# Patient Record
Sex: MALE | Race: WHITE | Employment: FULL TIME | URBAN - METROPOLITAN AREA
[De-identification: names, ages, dates, MRNs, and addresses within clinical notes are randomized per-mention and may not be internally consistent; named-entity substitution may affect disease eponyms.]

---

## 2017-04-06 DIAGNOSIS — I10 ESSENTIAL HYPERTENSION: ICD-10-CM

## 2017-04-06 RX ORDER — LISINOPRIL AND HYDROCHLOROTHIAZIDE 25; 20 MG/1; MG/1
1 TABLET ORAL DAILY
Qty: 90 TABLET | Refills: 0 | Status: SHIPPED | OUTPATIENT
Start: 2017-04-06 | End: 2017-07-07 | Stop reason: SDUPTHER

## 2017-04-21 ENCOUNTER — OFFICE VISIT (OUTPATIENT)
Dept: FAMILY MEDICINE CLINIC | Age: 57
End: 2017-04-21

## 2017-04-21 VITALS
WEIGHT: 263 LBS | HEART RATE: 72 BPM | HEIGHT: 71 IN | SYSTOLIC BLOOD PRESSURE: 118 MMHG | BODY MASS INDEX: 36.82 KG/M2 | DIASTOLIC BLOOD PRESSURE: 84 MMHG | RESPIRATION RATE: 20 BRPM | TEMPERATURE: 97.3 F

## 2017-04-21 DIAGNOSIS — N40.0 BENIGN PROSTATIC HYPERPLASIA WITHOUT LOWER URINARY TRACT SYMPTOMS, UNSPECIFIED MORPHOLOGY: ICD-10-CM

## 2017-04-21 DIAGNOSIS — Z87.898 HISTORY OF ELEVATED PSA: ICD-10-CM

## 2017-04-21 DIAGNOSIS — E11.9 CONTROLLED TYPE 2 DIABETES MELLITUS WITHOUT COMPLICATION, WITHOUT LONG-TERM CURRENT USE OF INSULIN (HCC): Primary | ICD-10-CM

## 2017-04-21 DIAGNOSIS — L30.9 DERMATITIS: ICD-10-CM

## 2017-04-21 DIAGNOSIS — E78.00 PURE HYPERCHOLESTEROLEMIA: ICD-10-CM

## 2017-04-21 DIAGNOSIS — E11.311 DIABETIC MACULAR EDEMA (HCC): ICD-10-CM

## 2017-04-21 DIAGNOSIS — I10 ESSENTIAL HYPERTENSION: ICD-10-CM

## 2017-04-21 DIAGNOSIS — Z11.4 SCREENING FOR HIV (HUMAN IMMUNODEFICIENCY VIRUS): ICD-10-CM

## 2017-04-21 PROCEDURE — 99214 OFFICE O/P EST MOD 30 MIN: CPT | Performed by: NURSE PRACTITIONER

## 2017-04-21 RX ORDER — KETOCONAZOLE 20 MG/ML
SHAMPOO TOPICAL
Qty: 1 BOTTLE | Refills: 1 | Status: SHIPPED | OUTPATIENT
Start: 2017-04-21 | End: 2018-04-21

## 2017-04-21 ASSESSMENT — ENCOUNTER SYMPTOMS
COUGH: 0
TROUBLE SWALLOWING: 0
WHEEZING: 0
CONSTIPATION: 0
DIARRHEA: 0
BLURRED VISION: 0
NAUSEA: 0
SHORTNESS OF BREATH: 0
VOMITING: 0
RHINORRHEA: 0
EYE PAIN: 0
BACK PAIN: 0
EYE DISCHARGE: 0
SORE THROAT: 0
ABDOMINAL PAIN: 0

## 2017-04-21 ASSESSMENT — PATIENT HEALTH QUESTIONNAIRE - PHQ9
2. FEELING DOWN, DEPRESSED OR HOPELESS: 0
SUM OF ALL RESPONSES TO PHQ9 QUESTIONS 1 & 2: 1
SUM OF ALL RESPONSES TO PHQ QUESTIONS 1-9: 1
1. LITTLE INTEREST OR PLEASURE IN DOING THINGS: 1

## 2017-04-26 ENCOUNTER — HOSPITAL ENCOUNTER (OUTPATIENT)
Age: 57
Setting detail: SPECIMEN
Discharge: HOME | End: 2017-04-26
Payer: COMMERCIAL

## 2017-04-26 DIAGNOSIS — Z87.898 HISTORY OF ELEVATED PSA: ICD-10-CM

## 2017-04-26 DIAGNOSIS — I10 ESSENTIAL HYPERTENSION: ICD-10-CM

## 2017-04-26 DIAGNOSIS — Z11.4 SCREENING FOR HIV (HUMAN IMMUNODEFICIENCY VIRUS): ICD-10-CM

## 2017-04-26 DIAGNOSIS — E78.00 PURE HYPERCHOLESTEROLEMIA: ICD-10-CM

## 2017-04-26 DIAGNOSIS — E11.9 CONTROLLED TYPE 2 DIABETES MELLITUS WITHOUT COMPLICATION, WITHOUT LONG-TERM CURRENT USE OF INSULIN (HCC): ICD-10-CM

## 2017-04-26 LAB
ALBUMIN SERPL-MCNC: 4.1 G/DL (ref 3.5–5.2)
ALBUMIN/GLOBULIN RATIO: 1.4 (ref 1–2.5)
ALP BLD-CCNC: 81 U/L (ref 40–129)
ALT SERPL-CCNC: 34 U/L (ref 5–41)
ANION GAP SERPL CALCULATED.3IONS-SCNC: 12 MMOL/L (ref 9–17)
AST SERPL-CCNC: 23 U/L
BILIRUB SERPL-MCNC: 0.66 MG/DL (ref 0.3–1.2)
BUN BLDV-MCNC: 13 MG/DL (ref 6–20)
BUN/CREAT BLD: ABNORMAL (ref 9–20)
CALCIUM SERPL-MCNC: 9.6 MG/DL (ref 8.6–10.4)
CHLORIDE BLD-SCNC: 103 MMOL/L (ref 98–107)
CHOLESTEROL/HDL RATIO: 2.6
CHOLESTEROL: 141 MG/DL
CO2: 26 MMOL/L (ref 20–31)
CREAT SERPL-MCNC: 0.74 MG/DL (ref 0.7–1.2)
ESTIMATED AVERAGE GLUCOSE: 140 MG/DL
GFR AFRICAN AMERICAN: >60 ML/MIN
GFR NON-AFRICAN AMERICAN: >60 ML/MIN
GFR SERPL CREATININE-BSD FRML MDRD: ABNORMAL ML/MIN/{1.73_M2}
GFR SERPL CREATININE-BSD FRML MDRD: ABNORMAL ML/MIN/{1.73_M2}
GLUCOSE BLD-MCNC: 116 MG/DL (ref 70–99)
HBA1C MFR BLD: 6.5 % (ref 4–6)
HDLC SERPL-MCNC: 55 MG/DL
HIV AG/AB: NONREACTIVE
LDL CHOLESTEROL: 76 MG/DL (ref 0–130)
POTASSIUM SERPL-SCNC: 4.8 MMOL/L (ref 3.7–5.3)
PROSTATE SPECIFIC ANTIGEN: 7.29 UG/L
SODIUM BLD-SCNC: 141 MMOL/L (ref 135–144)
TOTAL PROTEIN: 7.1 G/DL (ref 6.4–8.3)
TRIGL SERPL-MCNC: 51 MG/DL
VLDLC SERPL CALC-MCNC: NORMAL MG/DL (ref 1–30)

## 2017-05-16 DIAGNOSIS — E78.00 PURE HYPERCHOLESTEROLEMIA: ICD-10-CM

## 2017-05-17 RX ORDER — SIMVASTATIN 20 MG
20 TABLET ORAL NIGHTLY
Qty: 90 TABLET | Refills: 3 | Status: SHIPPED | OUTPATIENT
Start: 2017-05-17 | End: 2018-05-16 | Stop reason: SDUPTHER

## 2017-05-24 LAB
CHOLESTEROL/HDL RATIO: 3.3
CHOLESTEROL: 154 MG/DL
GLUCOSE BLD-MCNC: 119 MG/DL (ref 70–99)
HDLC SERPL-MCNC: 46 MG/DL
LDL CHOLESTEROL: 93 MG/DL (ref 0–130)
PATIENT FASTING?: YES
TRIGL SERPL-MCNC: 73 MG/DL
VLDLC SERPL CALC-MCNC: ABNORMAL MG/DL (ref 1–30)

## 2017-05-26 ENCOUNTER — EMPLOYEE WELLNESS (OUTPATIENT)
Dept: OTHER | Age: 57
End: 2017-05-26

## 2017-07-07 DIAGNOSIS — I10 ESSENTIAL HYPERTENSION: ICD-10-CM

## 2017-07-07 RX ORDER — LISINOPRIL AND HYDROCHLOROTHIAZIDE 25; 20 MG/1; MG/1
1 TABLET ORAL DAILY
Qty: 90 TABLET | Refills: 1 | Status: SHIPPED | OUTPATIENT
Start: 2017-07-07 | End: 2017-10-20 | Stop reason: SDUPTHER

## 2017-10-20 ENCOUNTER — HOSPITAL ENCOUNTER (OUTPATIENT)
Age: 57
Setting detail: SPECIMEN
Discharge: HOME | End: 2017-10-20
Payer: COMMERCIAL

## 2017-10-20 ENCOUNTER — OFFICE VISIT (OUTPATIENT)
Dept: FAMILY MEDICINE CLINIC | Age: 57
End: 2017-10-20
Payer: COMMERCIAL

## 2017-10-20 VITALS
SYSTOLIC BLOOD PRESSURE: 110 MMHG | DIASTOLIC BLOOD PRESSURE: 80 MMHG | HEIGHT: 71 IN | WEIGHT: 261.6 LBS | BODY MASS INDEX: 36.62 KG/M2 | RESPIRATION RATE: 16 BRPM | HEART RATE: 82 BPM

## 2017-10-20 DIAGNOSIS — E78.00 PURE HYPERCHOLESTEROLEMIA: ICD-10-CM

## 2017-10-20 DIAGNOSIS — R97.20 ELEVATED PSA: ICD-10-CM

## 2017-10-20 DIAGNOSIS — E11.9 CONTROLLED TYPE 2 DIABETES MELLITUS WITHOUT COMPLICATION, WITHOUT LONG-TERM CURRENT USE OF INSULIN (HCC): ICD-10-CM

## 2017-10-20 DIAGNOSIS — N40.0 BENIGN PROSTATIC HYPERPLASIA WITHOUT LOWER URINARY TRACT SYMPTOMS: ICD-10-CM

## 2017-10-20 DIAGNOSIS — I10 ESSENTIAL HYPERTENSION: ICD-10-CM

## 2017-10-20 DIAGNOSIS — E11.311 DIABETIC MACULAR EDEMA (HCC): ICD-10-CM

## 2017-10-20 DIAGNOSIS — E11.9 CONTROLLED TYPE 2 DIABETES MELLITUS WITHOUT COMPLICATION, WITHOUT LONG-TERM CURRENT USE OF INSULIN (HCC): Primary | ICD-10-CM

## 2017-10-20 LAB
ALBUMIN SERPL-MCNC: 4.2 G/DL (ref 3.5–5.2)
ALBUMIN/GLOBULIN RATIO: 1.5 (ref 1–2.5)
ALP BLD-CCNC: 89 U/L (ref 40–129)
ALT SERPL-CCNC: 48 U/L (ref 5–41)
ANION GAP SERPL CALCULATED.3IONS-SCNC: 15 MMOL/L (ref 9–17)
AST SERPL-CCNC: 28 U/L
BILIRUB SERPL-MCNC: 0.75 MG/DL (ref 0.3–1.2)
BUN BLDV-MCNC: 17 MG/DL (ref 6–20)
BUN/CREAT BLD: ABNORMAL (ref 9–20)
CALCIUM SERPL-MCNC: 9.9 MG/DL (ref 8.6–10.4)
CHLORIDE BLD-SCNC: 99 MMOL/L (ref 98–107)
CHOLESTEROL/HDL RATIO: 3
CHOLESTEROL: 142 MG/DL
CO2: 27 MMOL/L (ref 20–31)
CREAT SERPL-MCNC: 0.72 MG/DL (ref 0.7–1.2)
CREATININE URINE: 146.3 MG/DL (ref 39–259)
GFR AFRICAN AMERICAN: >60 ML/MIN
GFR NON-AFRICAN AMERICAN: >60 ML/MIN
GFR SERPL CREATININE-BSD FRML MDRD: ABNORMAL ML/MIN/{1.73_M2}
GFR SERPL CREATININE-BSD FRML MDRD: ABNORMAL ML/MIN/{1.73_M2}
GLUCOSE BLD-MCNC: 111 MG/DL (ref 70–99)
HCT VFR BLD CALC: 43.8 % (ref 41–53)
HDLC SERPL-MCNC: 47 MG/DL
HEMOGLOBIN: 14.7 G/DL (ref 13.5–17.5)
LDL CHOLESTEROL: 75 MG/DL (ref 0–130)
MCH RBC QN AUTO: 29.3 PG (ref 26–34)
MCHC RBC AUTO-ENTMCNC: 33.6 G/DL (ref 31–37)
MCV RBC AUTO: 87.2 FL (ref 80–100)
MICROALBUMIN/CREAT 24H UR: <12 MG/L
MICROALBUMIN/CREAT UR-RTO: 8 MCG/MG CREAT
PDW BLD-RTO: 14.3 % (ref 12.5–15.4)
PLATELET # BLD: 169 K/UL (ref 140–450)
PMV BLD AUTO: 8.2 FL (ref 6–12)
POTASSIUM SERPL-SCNC: 4.3 MMOL/L (ref 3.7–5.3)
RBC # BLD: 5.02 M/UL (ref 4.5–5.9)
SODIUM BLD-SCNC: 141 MMOL/L (ref 135–144)
TOTAL PROTEIN: 7 G/DL (ref 6.4–8.3)
TRIGL SERPL-MCNC: 98 MG/DL
VLDLC SERPL CALC-MCNC: NORMAL MG/DL (ref 1–30)
WBC # BLD: 4.5 K/UL (ref 3.5–11)

## 2017-10-20 PROCEDURE — 99214 OFFICE O/P EST MOD 30 MIN: CPT | Performed by: NURSE PRACTITIONER

## 2017-10-20 RX ORDER — LISINOPRIL AND HYDROCHLOROTHIAZIDE 25; 20 MG/1; MG/1
1 TABLET ORAL DAILY
Qty: 90 TABLET | Refills: 1 | Status: SHIPPED | OUTPATIENT
Start: 2017-10-20 | End: 2018-05-16 | Stop reason: SDUPTHER

## 2017-10-20 ASSESSMENT — ENCOUNTER SYMPTOMS
VOMITING: 0
RHINORRHEA: 1
TROUBLE SWALLOWING: 0
DIARRHEA: 0
NAUSEA: 0
CONSTIPATION: 0
EYE DISCHARGE: 0
BACK PAIN: 1
BLURRED VISION: 0
EYE PAIN: 0
COUGH: 0
SHORTNESS OF BREATH: 0
SORE THROAT: 0
ABDOMINAL PAIN: 0
WHEEZING: 0

## 2017-10-20 NOTE — PROGRESS NOTES
and flavored carbonated perez. Was drinking a lot of Pepsi. Got flu shot at work on Tuesday. Hypertension   This is a chronic problem. The current episode started more than 1 year ago. The problem is controlled. Pertinent negatives include no blurred vision, chest pain, headaches, shortness of breath or sweats. Risk factors for coronary artery disease include diabetes mellitus, male gender, obesity, dyslipidemia and sedentary lifestyle. Compliance problems include diet and exercise. Diabetes   He presents for his follow-up diabetic visit. He has type 2 diabetes mellitus. The initial diagnosis of diabetes was made 8 years ago. His disease course has been stable. There are no hypoglycemic associated symptoms. Pertinent negatives for hypoglycemia include no confusion, dizziness, headaches, nervousness/anxiousness, sleepiness or sweats. There are no diabetic associated symptoms. Pertinent negatives for diabetes include no blurred vision and no chest pain. There are no hypoglycemic complications. There are no diabetic complications. Risk factors for coronary artery disease include diabetes mellitus, hypertension, male sex and obesity. He is compliant with treatment most of the time. When asked about meal planning, he reported none. He has not had a previous visit with a dietitian. Exercise: twice a week. There is no change in his home blood glucose trend. His breakfast blood glucose is taken between 5-6 am. His breakfast blood glucose range is generally 130-140 mg/dl. He does not see a podiatrist.Eye exam is current. Hyperlipidemia   This is a chronic problem. The current episode started more than 1 year ago. Exacerbating diseases include diabetes and obesity. Pertinent negatives include no chest pain or shortness of breath. Compliance problems include adherence to diet and adherence to exercise.   Risk factors for coronary artery disease include diabetes mellitus, hypertension, male sex, obesity and a Alana Rosales MD   6. Diabetic macular edema (HCC)             Plan:      Complete routine labs when fasting  Continue medications as prescribed  Recommend healthy diet and regular exercise  Encouraged to monitor blood sugars regularly  Proceed with referral to Urology. He never followed up with Dr. Ivan Jaime. Prefers to see specialist at [facility]. Diabetic foot exam today  Will obtain records from eye exam  Call office with concerns2        1. Valerio received counseling on the following healthy behaviors: nutrition, exercise and medication adherence  2. Reviewed prior labs and health maintenance  3. Continue current medications, diet and exercise. 4.  Discussed use, benefit, and side effects of prescribed medications. Barriers to medication compliance addressed. All patient questions answered. Pt voiced understanding. 5.  Patient given educational materials - see patient instructions  6. Was a self-tracking handout given in paper form or via [employer]? No  If yes, see orders or list here.     PHQ Scores 4/21/2017 4/8/2016   PHQ2 Score 1 0   PHQ9 Score 1 0     Interpretation of Total Score Depression Severity: 1-4 = Minimal depression, 5-9 = Mild depression, 10-14 = Moderate depression, 15-19 = Moderately severe depression, 20-27 = Severe depression  mild depressed mood, will follow    Completed Refills   Requested Prescriptions     Signed Prescriptions Disp Refills    lisinopril-hydrochlorothiazide (PRINZIDE;ZESTORETIC) 20-25 MG per tablet 90 tablet 1     Sig: Take 1 tablet by mouth daily

## 2017-10-20 NOTE — PROGRESS NOTES
Subjective:      Patient ID: Anitra Gates is a 59 y.o. male. Visit Information    Have you changed or started any medications since your last visit including any over-the-counter medicines, vitamins, or herbal medicines? no   Are you having any side effects from any of your medications? -  no  Have you stopped taking any of your medications? Is so, why? -  no    Have you seen any other physician or provider since your last visit? Yes - Records Requested  Have you had any other diagnostic tests since your last visit? No  Have you been seen in the emergency room and/or had an admission to a hospital since we last saw you? No  Have you had your routine dental cleaning in the past 6 months? no    Have you activated your [employer] account? If not, what are your barriers? Yes     Patient Care Team:  Ryan Rosales CNP as PCP - General (Family Medicine)  Ryan Rosales CNP as PCP - S Attributed Provider    Medical History Review  Past Medical, Family, and Social History reviewed and does contribute to the patient presenting condition    Health Maintenance   Topic Date Due    Diabetic retinal exam  08/12/2017    Diabetic foot exam  10/07/2017    Diabetic microalbuminuria test  10/12/2017    Diabetic hemoglobin A1C test  04/26/2018    Lipid screen  04/26/2018    Colon cancer screen colonoscopy  01/10/2023    DTaP/Tdap/Td vaccine (2 - Td) 07/10/2023    Flu vaccine  Completed    Pneumococcal med risk  Completed    Hepatitis C screen  Completed    HIV screen  Completed       Patient presents in office today for routine follow up on chronic conditions including hypertension, hyperlipidemia, diabetes and elevated PSA. Checking blood sugars couple times per week in AM.       Hypertension   This is a chronic problem. The current episode started more than 1 year ago. The problem is unchanged. The problem is controlled. There are no associated agents to hypertension.  Risk factors for coronary artery disease include

## 2017-10-21 LAB
ESTIMATED AVERAGE GLUCOSE: 151 MG/DL
HBA1C MFR BLD: 6.9 % (ref 4–6)

## 2017-10-23 DIAGNOSIS — E11.9 CONTROLLED TYPE 2 DIABETES MELLITUS WITHOUT COMPLICATION, WITHOUT LONG-TERM CURRENT USE OF INSULIN (HCC): Primary | ICD-10-CM

## 2017-11-07 DIAGNOSIS — E11.9 CONTROLLED TYPE 2 DIABETES MELLITUS WITHOUT COMPLICATION, WITHOUT LONG-TERM CURRENT USE OF INSULIN (HCC): Primary | ICD-10-CM

## 2017-12-13 ENCOUNTER — CLINICAL DOCUMENTATION (OUTPATIENT)
Dept: PHARMACY | Facility: CLINIC | Age: 57
End: 2017-12-13

## 2018-02-07 DIAGNOSIS — E11.9 CONTROLLED TYPE 2 DIABETES MELLITUS WITHOUT COMPLICATION, WITHOUT LONG-TERM CURRENT USE OF INSULIN (HCC): ICD-10-CM

## 2018-02-08 NOTE — TELEPHONE ENCOUNTER
Last refill was 11/7/2017 #30-2  Last visit was 10/20/2017 RTO 6 months  Health Maintenance   Topic Date Due    Diabetic retinal exam  08/12/2017    Diabetic foot exam  10/20/2018    A1C test (Diabetic or Prediabetic)  10/20/2018    Diabetic microalbuminuria test  10/20/2018    Lipid screen  10/20/2018    Potassium monitoring  10/20/2018    Creatinine monitoring  10/20/2018    Colon cancer screen colonoscopy  01/10/2023    DTaP/Tdap/Td vaccine (2 - Td) 07/10/2023    Flu vaccine  Completed    Pneumococcal med risk  Completed    Hepatitis C screen  Completed    HIV screen  Completed             (applicable per patient's age: Cancer Screenings, Depression Screening, Fall Risk Screening, Immunizations)    Hemoglobin A1C (%)   Date Value   10/20/2017 6.9 (H)   04/26/2017 6.5 (H)   10/12/2016 6.6 (H)     Microalb/Crt.  Ratio (mcg/mg creat)   Date Value   10/20/2017 8     LDL Cholesterol (mg/dL)   Date Value   10/20/2017 75     AST (U/L)   Date Value   10/20/2017 28     ALT (U/L)   Date Value   10/20/2017 48 (H)     BUN (mg/dL)   Date Value   10/20/2017 17      (goal A1C is < 7)   (goal LDL is <100) need 30-50% reduction from baseline     BP Readings from Last 3 Encounters:   10/20/17 110/80   04/21/17 118/84   10/07/16 (!) 152/94    (goal /80)      All Future Testing planned in [facility]:  Lab Frequency Next Occurrence   Hemoglobin A1C Once 01/21/2018   Comprehensive Metabolic Panel Once 42/20/9668       Next Visit Date:  Future Appointments  Date Time Provider Kyaw Cramer   4/20/2018 8:00 AM DELORIS Amaya [facility]            Patient Active Problem List:     Hypertension     Hyperlipidemia     Elevated liver enzymes     BPH (benign prostatic hyperplasia)     Diabetic macular edema (HCC)     Diabetes mellitus type II, controlled ([address]     Elevated PSA

## 2018-03-20 VITALS — WEIGHT: 258 LBS | BODY MASS INDEX: 35.98 KG/M2

## 2018-05-07 ENCOUNTER — TELEPHONE (OUTPATIENT)
Dept: PHARMACY | Facility: CLINIC | Age: 58
End: 2018-05-07

## 2018-05-10 ENCOUNTER — TELEPHONE (OUTPATIENT)
Dept: FAMILY MEDICINE CLINIC | Age: 58
End: 2018-05-10

## 2018-05-16 ENCOUNTER — OFFICE VISIT (OUTPATIENT)
Dept: FAMILY MEDICINE CLINIC | Age: 58
End: 2018-05-16
Payer: COMMERCIAL

## 2018-05-16 ENCOUNTER — HOSPITAL ENCOUNTER (OUTPATIENT)
Age: 58
Setting detail: SPECIMEN
Discharge: HOME | End: 2018-05-16
Payer: COMMERCIAL

## 2018-05-16 VITALS
RESPIRATION RATE: 18 BRPM | HEART RATE: 76 BPM | BODY MASS INDEX: 35.57 KG/M2 | TEMPERATURE: 96.1 F | DIASTOLIC BLOOD PRESSURE: 84 MMHG | SYSTOLIC BLOOD PRESSURE: 127 MMHG | WEIGHT: 255 LBS

## 2018-05-16 DIAGNOSIS — R97.20 ELEVATED PSA: ICD-10-CM

## 2018-05-16 DIAGNOSIS — I10 ESSENTIAL HYPERTENSION: Primary | ICD-10-CM

## 2018-05-16 DIAGNOSIS — E11.9 CONTROLLED TYPE 2 DIABETES MELLITUS WITHOUT COMPLICATION, WITHOUT LONG-TERM CURRENT USE OF INSULIN (HCC): ICD-10-CM

## 2018-05-16 DIAGNOSIS — E11.311 DIABETIC MACULAR EDEMA (HCC): ICD-10-CM

## 2018-05-16 DIAGNOSIS — H61.23 BILATERAL IMPACTED CERUMEN: ICD-10-CM

## 2018-05-16 DIAGNOSIS — E78.00 PURE HYPERCHOLESTEROLEMIA: ICD-10-CM

## 2018-05-16 LAB
ALBUMIN SERPL-MCNC: 4.1 G/DL (ref 3.5–5.2)
ALBUMIN/GLOBULIN RATIO: 1.3 (ref 1–2.5)
ALP BLD-CCNC: 90 U/L (ref 40–129)
ALT SERPL-CCNC: 39 U/L (ref 5–41)
ANION GAP SERPL CALCULATED.3IONS-SCNC: 14 MMOL/L (ref 9–17)
AST SERPL-CCNC: 27 U/L
BILIRUB SERPL-MCNC: 0.46 MG/DL (ref 0.3–1.2)
BUN BLDV-MCNC: 12 MG/DL (ref 6–20)
BUN/CREAT BLD: ABNORMAL (ref 9–20)
CALCIUM SERPL-MCNC: 9.3 MG/DL (ref 8.6–10.4)
CHLORIDE BLD-SCNC: 100 MMOL/L (ref 98–107)
CHOLESTEROL, FASTING: 139 MG/DL
CHOLESTEROL/HDL RATIO: 3
CO2: 24 MMOL/L (ref 20–31)
CREAT SERPL-MCNC: 0.72 MG/DL (ref 0.7–1.2)
ESTIMATED AVERAGE GLUCOSE: 131 MG/DL
GFR AFRICAN AMERICAN: >60 ML/MIN
GFR NON-AFRICAN AMERICAN: >60 ML/MIN
GFR SERPL CREATININE-BSD FRML MDRD: ABNORMAL ML/MIN/{1.73_M2}
GFR SERPL CREATININE-BSD FRML MDRD: ABNORMAL ML/MIN/{1.73_M2}
GLUCOSE BLD-MCNC: 116 MG/DL (ref 70–99)
HBA1C MFR BLD: 6.2 % (ref 4–6)
HDLC SERPL-MCNC: 47 MG/DL
LDL CHOLESTEROL: 73 MG/DL (ref 0–130)
POTASSIUM SERPL-SCNC: 4.4 MMOL/L (ref 3.7–5.3)
PROSTATE SPECIFIC ANTIGEN: 8.56 UG/L
SODIUM BLD-SCNC: 138 MMOL/L (ref 135–144)
TOTAL PROTEIN: 7.2 G/DL (ref 6.4–8.3)
TRIGLYCERIDE, FASTING: 97 MG/DL
VLDLC SERPL CALC-MCNC: NORMAL MG/DL (ref 1–30)

## 2018-05-16 PROCEDURE — 99214 OFFICE O/P EST MOD 30 MIN: CPT | Performed by: NURSE PRACTITIONER

## 2018-05-16 RX ORDER — LISINOPRIL AND HYDROCHLOROTHIAZIDE 25; 20 MG/1; MG/1
1 TABLET ORAL DAILY
Qty: 90 TABLET | Refills: 1 | Status: SHIPPED | OUTPATIENT
Start: 2018-05-16 | End: 2018-07-09 | Stop reason: SDUPTHER

## 2018-05-16 RX ORDER — SIMVASTATIN 20 MG
20 TABLET ORAL NIGHTLY
Qty: 90 TABLET | Refills: 3 | Status: SHIPPED | OUTPATIENT
Start: 2018-05-16 | End: 2019-05-22 | Stop reason: SDUPTHER

## 2018-05-16 ASSESSMENT — ENCOUNTER SYMPTOMS
DIARRHEA: 0
ABDOMINAL PAIN: 0
RHINORRHEA: 0
TROUBLE SWALLOWING: 0
SHORTNESS OF BREATH: 0
CONSTIPATION: 0
WHEEZING: 0
VOMITING: 0
NAUSEA: 0
EYE PAIN: 0
SORE THROAT: 0
EYE DISCHARGE: 0
BACK PAIN: 1
COUGH: 0

## 2018-05-16 ASSESSMENT — PATIENT HEALTH QUESTIONNAIRE - PHQ9
1. LITTLE INTEREST OR PLEASURE IN DOING THINGS: 0
SUM OF ALL RESPONSES TO PHQ9 QUESTIONS 1 & 2: 0
2. FEELING DOWN, DEPRESSED OR HOPELESS: 0
SUM OF ALL RESPONSES TO PHQ QUESTIONS 1-9: 0

## 2018-05-17 ENCOUNTER — SCHEDULED TELEPHONE ENCOUNTER (OUTPATIENT)
Dept: PHARMACY | Facility: CLINIC | Age: 58
End: 2018-05-17

## 2018-05-17 DIAGNOSIS — E11.9 CONTROLLED TYPE 2 DIABETES MELLITUS WITHOUT COMPLICATION, WITHOUT LONG-TERM CURRENT USE OF INSULIN (HCC): Primary | ICD-10-CM

## 2018-05-18 RX ORDER — LANCETS 33 GAUGE
EACH MISCELLANEOUS
Qty: 100 EACH | Refills: 3 | Status: SHIPPED | OUTPATIENT
Start: 2018-05-18 | End: 2019-05-24 | Stop reason: SDUPTHER

## 2018-05-18 RX ORDER — BLOOD-GLUCOSE METER
EACH MISCELLANEOUS
Qty: 1 DEVICE | Refills: 0 | Status: SHIPPED | OUTPATIENT
Start: 2018-05-18 | End: 2019-06-04 | Stop reason: SDUPTHER

## 2018-05-18 RX ORDER — BLOOD-GLUCOSE METER
EACH MISCELLANEOUS
Qty: 1 DEVICE | Refills: 0 | Status: SHIPPED | OUTPATIENT
Start: 2018-05-18 | End: 2018-06-19 | Stop reason: SDUPTHER

## 2018-05-18 RX ORDER — ASPIRIN 81 MG/1
TABLET ORAL
Qty: 100 TABLET | Refills: 3 | Status: SHIPPED | OUTPATIENT
Start: 2018-05-18 | End: 2018-06-19 | Stop reason: SDUPTHER

## 2018-06-19 ENCOUNTER — OFFICE VISIT (OUTPATIENT)
Dept: UROLOGY | Age: 58
End: 2018-06-19
Payer: COMMERCIAL

## 2018-06-19 VITALS
WEIGHT: 258 LBS | TEMPERATURE: 97.7 F | HEART RATE: 81 BPM | DIASTOLIC BLOOD PRESSURE: 80 MMHG | BODY MASS INDEX: 36.12 KG/M2 | SYSTOLIC BLOOD PRESSURE: 130 MMHG | HEIGHT: 71 IN

## 2018-06-19 DIAGNOSIS — R97.20 ELEVATED PSA: Primary | ICD-10-CM

## 2018-06-19 DIAGNOSIS — N40.0 BPH WITHOUT OBSTRUCTION/LOWER URINARY TRACT SYMPTOMS: ICD-10-CM

## 2018-06-19 PROCEDURE — 99204 OFFICE O/P NEW MOD 45 MIN: CPT | Performed by: UROLOGY

## 2018-06-19 ASSESSMENT — ENCOUNTER SYMPTOMS
BACK PAIN: 0
ABDOMINAL PAIN: 0
SHORTNESS OF BREATH: 0
EYE PAIN: 0
NAUSEA: 0
VOMITING: 0
COUGH: 0
WHEEZING: 0
EYE REDNESS: 0
COLOR CHANGE: 0

## 2018-06-20 ENCOUNTER — OFFICE VISIT (OUTPATIENT)
Dept: FAMILY MEDICINE CLINIC | Age: 58
End: 2018-06-20
Payer: COMMERCIAL

## 2018-06-20 VITALS
HEART RATE: 82 BPM | TEMPERATURE: 97.6 F | HEIGHT: 71 IN | OXYGEN SATURATION: 98 % | WEIGHT: 258 LBS | SYSTOLIC BLOOD PRESSURE: 130 MMHG | DIASTOLIC BLOOD PRESSURE: 76 MMHG | BODY MASS INDEX: 36.12 KG/M2

## 2018-06-20 DIAGNOSIS — Z02.89 ENCOUNTER FOR PHYSICAL EXAMINATION RELATED TO EMPLOYMENT: Primary | ICD-10-CM

## 2018-06-20 PROCEDURE — 99213 OFFICE O/P EST LOW 20 MIN: CPT | Performed by: NURSE PRACTITIONER

## 2018-06-20 ASSESSMENT — ENCOUNTER SYMPTOMS
ALLERGIC/IMMUNOLOGIC NEGATIVE: 1
CHANGE IN BOWEL HABIT: 0
SORE THROAT: 0
CHEST TIGHTNESS: 0
SHORTNESS OF BREATH: 0
NAUSEA: 0
VISUAL CHANGE: 0
ABDOMINAL PAIN: 0
EYES NEGATIVE: 1
DIARRHEA: 0
EYE ITCHING: 0
EYE DISCHARGE: 0
COUGH: 0
VOMITING: 0
SWOLLEN GLANDS: 0

## 2018-07-02 ENCOUNTER — HOSPITAL ENCOUNTER (OUTPATIENT)
Dept: MRI IMAGING | Age: 58
Discharge: HOME | End: 2018-07-04
Payer: COMMERCIAL

## 2018-07-02 DIAGNOSIS — R97.20 ELEVATED PSA: ICD-10-CM

## 2018-07-02 LAB
BUN BLDV-MCNC: 17 MG/DL (ref 6–20)
CREAT SERPL-MCNC: 0.88 MG/DL (ref 0.7–1.2)
GFR AFRICAN AMERICAN: >60 ML/MIN
GFR NON-AFRICAN AMERICAN: >60 ML/MIN
GFR SERPL CREATININE-BSD FRML MDRD: NORMAL ML/MIN/{1.73_M2}
GFR SERPL CREATININE-BSD FRML MDRD: NORMAL ML/MIN/{1.73_M2}

## 2018-07-02 PROCEDURE — 82565 ASSAY OF CREATININE: CPT

## 2018-07-02 PROCEDURE — A9579 GAD-BASE MR CONTRAST NOS,1ML: HCPCS | Performed by: UROLOGY

## 2018-07-02 PROCEDURE — 2580000003 HC RX 258: Performed by: UROLOGY

## 2018-07-02 PROCEDURE — 84520 ASSAY OF UREA NITROGEN: CPT

## 2018-07-02 PROCEDURE — 72197 MRI PELVIS W/O & W/DYE: CPT

## 2018-07-02 PROCEDURE — 6360000004 HC RX CONTRAST MEDICATION: Performed by: UROLOGY

## 2018-07-02 PROCEDURE — 36415 COLL VENOUS BLD VENIPUNCTURE: CPT

## 2018-07-02 RX ORDER — 0.9 % SODIUM CHLORIDE 0.9 %
40 INTRAVENOUS SOLUTION INTRAVENOUS ONCE
Status: COMPLETED | OUTPATIENT
Start: 2018-07-02 | End: 2018-07-02

## 2018-07-02 RX ADMIN — GADOTERIDOL 20 ML: 279.3 INJECTION, SOLUTION INTRAVENOUS at 19:42

## 2018-07-02 RX ADMIN — SODIUM CHLORIDE 40 ML: 9 INJECTION, SOLUTION INTRAVENOUS at 19:42

## 2018-07-09 DIAGNOSIS — I10 ESSENTIAL HYPERTENSION: ICD-10-CM

## 2018-07-13 RX ORDER — LISINOPRIL AND HYDROCHLOROTHIAZIDE 25; 20 MG/1; MG/1
1 TABLET ORAL DAILY
Qty: 90 TABLET | Refills: 1 | Status: SHIPPED | OUTPATIENT
Start: 2018-07-13 | End: 2019-01-11 | Stop reason: SDUPTHER

## 2018-08-10 ENCOUNTER — OFFICE VISIT (OUTPATIENT)
Dept: UROLOGY | Age: 58
End: 2018-08-10
Payer: COMMERCIAL

## 2018-08-10 VITALS — SYSTOLIC BLOOD PRESSURE: 117 MMHG | TEMPERATURE: 97.9 F | HEART RATE: 85 BPM | DIASTOLIC BLOOD PRESSURE: 78 MMHG

## 2018-08-10 DIAGNOSIS — N40.0 BPH WITHOUT OBSTRUCTION/LOWER URINARY TRACT SYMPTOMS: ICD-10-CM

## 2018-08-10 DIAGNOSIS — R97.20 ELEVATED PSA: Primary | ICD-10-CM

## 2018-08-10 PROCEDURE — 99213 OFFICE O/P EST LOW 20 MIN: CPT | Performed by: UROLOGY

## 2018-08-10 ASSESSMENT — ENCOUNTER SYMPTOMS
NAUSEA: 0
EYE PAIN: 0
COLOR CHANGE: 0
ABDOMINAL PAIN: 0
EYE REDNESS: 0
COUGH: 0
WHEEZING: 0
SHORTNESS OF BREATH: 0
BACK PAIN: 0
VOMITING: 0

## 2018-08-10 NOTE — PROGRESS NOTES
and time. Psych: Mood normal, affect normal  Skin: No rash noted  HEENT: Head: Normocephalic and atraumatic  Conjunctivae and EOM are normal. Pupils are equal, round  Nose: Normal  Right External Ear: Normal; Left External Ear: Normal  Mouth: Mucosa Moist  Neck: Supple  Lungs: Respiratory effort is normal  Cardiovascular: Warm & Pink  Abdomen: Soft, non-tender, non-distended with no CVA,  No flank tenderness,  Or hepatosplenomegaly   Lymphatics: No palpable lymphadenopathy. Bladder non-tender and not distended. Musculoskeletal: Normal gait and station  Testiscles: Normal, bilaterally  Prostate:    Assessment and Plan      1. Elevated PSA    2. BPH without obstruction/lower urinary tract symptoms           Plan:         PSA has been rising, but he has had 2 negative biopsies and now a negative MRI. Will repeat a PSA in 3 months. If worsens, consider Select MDx urine test to decide on saturation biopsy vs continued observation. Return in about 3 months (around 11/10/2018) for Labs. Prescriptions Ordered:  No orders of the defined types were placed in this encounter.      Orders Placed:  Orders Placed This Encounter   Procedures    PSA, Diagnostic     Standing Status:   Future     Standing Expiration Date:   8/10/2019          Ruben Manning MD

## 2018-08-17 DIAGNOSIS — E11.9 CONTROLLED TYPE 2 DIABETES MELLITUS WITHOUT COMPLICATION, WITHOUT LONG-TERM CURRENT USE OF INSULIN (HCC): ICD-10-CM

## 2018-08-17 NOTE — TELEPHONE ENCOUNTER
LRF 5/18/2018 #100 RF3  LOV 5/16/2018  RTO 6 Months    Health Maintenance   Topic Date Due    Shingles Vaccine (1 of 2 - 2 Dose Series) 05/04/2019 (Originally 7/24/2008)    Flu vaccine (1) 09/01/2018    Diabetic retinal exam  10/18/2018    Diabetic foot exam  10/20/2018    Diabetic microalbuminuria test  10/20/2018    A1C test (Diabetic or Prediabetic)  05/16/2019    Lipid screen  05/16/2019    Potassium monitoring  05/16/2019    Creatinine monitoring  07/02/2019    Colon cancer screen colonoscopy  01/10/2023    DTaP/Tdap/Td vaccine (2 - Td) 07/10/2023    Pneumococcal med risk  Completed    Hepatitis C screen  Completed    HIV screen  Completed             (applicable per patient's age: Cancer Screenings, Depression Screening, Fall Risk Screening, Immunizations)    Hemoglobin A1C (%)   Date Value   05/16/2018 6.2 (H)   10/20/2017 6.9 (H)   04/26/2017 6.5 (H)     Microalb/Crt.  Ratio (mcg/mg creat)   Date Value   10/20/2017 8     LDL Cholesterol (mg/dL)   Date Value   05/16/2018 73     AST (U/L)   Date Value   05/16/2018 27     ALT (U/L)   Date Value   05/16/2018 39     BUN (mg/dL)   Date Value   07/02/2018 17      (goal A1C is < 7)   (goal LDL is <100) need 30-50% reduction from baseline     BP Readings from Last 3 Encounters:   08/10/18 117/78   06/20/18 130/76   06/19/18 130/80    (goal /80)      All Future Testing planned in [facility]:  Lab Frequency Next Occurrence   PSA, Diagnostic Once 11/08/2018       Next Visit Date:  Future Appointments  Date Time Provider Kyaw Cramer   11/14/2018 8:00 AM [facility]   11/20/2018 10:30 AM JAMES Silva  [facility]            Patient Active Problem List:     Hypertension     Hyperlipidemia     Elevated liver enzymes     BPH (benign prostatic hyperplasia)     Diabetic macular edema ([address]     Diabetes mellitus type II, controlled ([address]     Elevated PSA

## 2018-10-10 ENCOUNTER — PATIENT MESSAGE (OUTPATIENT)
Dept: PHARMACY | Facility: CLINIC | Age: 58
End: 2018-10-10

## 2018-11-05 ENCOUNTER — TELEPHONE (OUTPATIENT)
Dept: PHARMACY | Facility: CLINIC | Age: 58
End: 2018-11-05

## 2018-11-12 NOTE — TELEPHONE ENCOUNTER
LOV 5-16-18  LRF 5-10-18    Health Maintenance   Topic Date Due    Flu vaccine (1) 09/01/2018    Diabetic foot exam  10/20/2018    Diabetic microalbuminuria test  10/20/2018    Diabetic retinal exam  10/18/2018    Shingles Vaccine (1 of 2 - 2 Dose Series) 05/04/2019 (Originally 7/24/2008)    A1C test (Diabetic or Prediabetic)  05/16/2019    Lipid screen  05/16/2019    Potassium monitoring  05/16/2019    Creatinine monitoring  07/02/2019    Colon cancer screen colonoscopy  01/10/2023    DTaP/Tdap/Td vaccine (2 - Td) 07/10/2023    Pneumococcal med risk  Completed    Hepatitis C screen  Completed    HIV screen  Completed             (applicable per patient's age: Cancer Screenings, Depression Screening, Fall Risk Screening, Immunizations)    Hemoglobin A1C (%)   Date Value   05/16/2018 6.2 (H)   10/20/2017 6.9 (H)   04/26/2017 6.5 (H)     Microalb/Crt.  Ratio (mcg/mg creat)   Date Value   10/20/2017 8     LDL Cholesterol (mg/dL)   Date Value   05/16/2018 73     AST (U/L)   Date Value   05/16/2018 27     ALT (U/L)   Date Value   05/16/2018 39     BUN (mg/dL)   Date Value   07/02/2018 17      (goal A1C is < 7)   (goal LDL is <100) need 30-50% reduction from baseline     BP Readings from Last 3 Encounters:   08/10/18 117/78   06/20/18 130/76   06/19/18 130/80    (goal /80)      All Future Testing planned in [facility]:  Lab Frequency Next Occurrence   PSA, Diagnostic Once 11/08/2018       Next Visit Date:  Future Appointments  Date Time Provider Kyaw Cramer   11/14/2018 8:00 AM [facility]e [facility]ro [facility] - [facility]   11/20/2018 10:30 AM Rodrigo Cruz APRN -  [facility]            Patient Active Problem List:     Hypertension     Hyperlipidemia     Elevated liver enzymes     BPH (benign prostatic hyperplasia)     Diabetic macular edema ([address]     Diabetes mellitus type II, controlled ([address]     Elevated PSA

## 2018-11-16 ENCOUNTER — HOSPITAL ENCOUNTER (OUTPATIENT)
Age: 58
Discharge: HOME | End: 2018-11-16
Payer: COMMERCIAL

## 2018-11-16 DIAGNOSIS — R97.20 ELEVATED PSA: ICD-10-CM

## 2018-11-16 LAB — PROSTATE SPECIFIC ANTIGEN: 6.79 UG/L

## 2018-11-16 PROCEDURE — 36415 COLL VENOUS BLD VENIPUNCTURE: CPT

## 2018-11-16 PROCEDURE — 84153 ASSAY OF PSA TOTAL: CPT

## 2018-11-20 ENCOUNTER — OFFICE VISIT (OUTPATIENT)
Dept: UROLOGY | Age: 58
End: 2018-11-20
Payer: COMMERCIAL

## 2018-11-20 ENCOUNTER — TELEPHONE (OUTPATIENT)
Dept: UROLOGY | Age: 58
End: 2018-11-20

## 2018-11-20 VITALS
DIASTOLIC BLOOD PRESSURE: 111 MMHG | HEIGHT: 71 IN | WEIGHT: 260 LBS | HEART RATE: 78 BPM | TEMPERATURE: 97.8 F | SYSTOLIC BLOOD PRESSURE: 158 MMHG | BODY MASS INDEX: 36.4 KG/M2

## 2018-11-20 DIAGNOSIS — R35.1 BENIGN PROSTATIC HYPERPLASIA WITH NOCTURIA: ICD-10-CM

## 2018-11-20 DIAGNOSIS — R39.12 WEAK URINARY STREAM: ICD-10-CM

## 2018-11-20 DIAGNOSIS — R97.20 ELEVATED PSA: Primary | ICD-10-CM

## 2018-11-20 DIAGNOSIS — N40.1 BENIGN PROSTATIC HYPERPLASIA WITH NOCTURIA: ICD-10-CM

## 2018-11-20 PROCEDURE — 99213 OFFICE O/P EST LOW 20 MIN: CPT | Performed by: NURSE PRACTITIONER

## 2018-11-20 ASSESSMENT — ENCOUNTER SYMPTOMS
VOMITING: 0
ABDOMINAL PAIN: 0
EYE REDNESS: 0
EYE PAIN: 0
WHEEZING: 0
NAUSEA: 0
BACK PAIN: 0
COLOR CHANGE: 0
SHORTNESS OF BREATH: 0
COUGH: 0

## 2018-11-20 NOTE — PROGRESS NOTES
months. Past BX and MRI - negative. Urinating well- continue blood sugar control. Prostate exam per Dr 2106 [facility] Shana- 48 GMS, no nodule. Repeat exam and PSA 6 months. Return in about 6 months (around 5/20/2019) for PSA- Dr 2106 [address] .    Prescriptions Ordered:  No orders of the defined types were placed in this encounter. Orders Placed:  No orders of the defined types were placed in this encounter. JAEMS Feldman CNP    Reviewed and Agree with the ROS entered by the MA.

## 2018-11-20 NOTE — LETTER
[facility]  [facility] - [place]  [address]  [address]  [address] [place] [id number]  Dept: [phone]  Dept Fax: [phone]        11/20/18    Patient: Raegan Lara  YOB: 1960    Dear JAMES Khan CNP,    I had the pleasure of seeing one of your patients, Brian Butler today in the office today. Below are the relevant portions of my assessment and plan of care. IMPRESSION:     1. Elevated PSA    2. Weak urinary stream    3. Benign prostatic hyperplasia with nocturia        PLAN:   PSA is down from previous, continues elevated 6.79. will repeat in 6 months. Past BX and MRI - negative. Urinating well- continue blood sugar control. Prostate exam per Dr Gustavo Guzman June- 48 GMS, no nodule. Repeat exam and PSA 6 months. No Follow-up on file. Prescriptions Ordered:  No orders of the defined types were placed in this encounter. Orders Placed:  No orders of the defined types were placed in this encounter. Thank you for allowing me to participate in the care of this patient. I will keep you updated on this patient's follow up and I look forward to serving you and your patients again in the future.     JAMES Marvin CNP

## 2018-11-26 ENCOUNTER — OFFICE VISIT (OUTPATIENT)
Dept: FAMILY MEDICINE CLINIC | Age: 58
End: 2018-11-26
Payer: COMMERCIAL

## 2018-11-26 VITALS
BODY MASS INDEX: 36.26 KG/M2 | TEMPERATURE: 96.6 F | SYSTOLIC BLOOD PRESSURE: 122 MMHG | HEART RATE: 88 BPM | DIASTOLIC BLOOD PRESSURE: 78 MMHG | WEIGHT: 260 LBS | RESPIRATION RATE: 18 BRPM

## 2018-11-26 DIAGNOSIS — E78.00 PURE HYPERCHOLESTEROLEMIA: ICD-10-CM

## 2018-11-26 DIAGNOSIS — E11.9 CONTROLLED TYPE 2 DIABETES MELLITUS WITHOUT COMPLICATION, WITHOUT LONG-TERM CURRENT USE OF INSULIN (HCC): ICD-10-CM

## 2018-11-26 DIAGNOSIS — I10 ESSENTIAL HYPERTENSION: Primary | ICD-10-CM

## 2018-11-26 DIAGNOSIS — R97.20 ELEVATED PSA: ICD-10-CM

## 2018-11-26 PROCEDURE — 99214 OFFICE O/P EST MOD 30 MIN: CPT | Performed by: NURSE PRACTITIONER

## 2018-11-26 ASSESSMENT — ENCOUNTER SYMPTOMS
EYE DISCHARGE: 0
BACK PAIN: 0
TROUBLE SWALLOWING: 0
RHINORRHEA: 0
ABDOMINAL PAIN: 0
SORE THROAT: 0
DIARRHEA: 0
VOMITING: 0
NAUSEA: 0
COUGH: 0
CONSTIPATION: 0
WHEEZING: 0
SHORTNESS OF BREATH: 0
EYE PAIN: 0

## 2018-11-26 NOTE — PROGRESS NOTES
and exercise. Discussed use, benefit, and side effects of prescribed medications. Barriers to medication compliance addressed. Patient given educational materials - see patient instructions  Was a self-tracking handout given in paper form or via [employer]? No:     Requested Prescriptions      No prescriptions requested or ordered in this encounter       All patient questions answered. Patient voiced understanding. Quality Measures    Body mass index is 36.26 kg/m². Elevated. Weight control planned discussed Healthy diet and regular exercise. BP: 122/78 Blood pressure is normal. Treatment plan consists of No treatment change needed.     Lab Results   Component Value Date    LDLCHOLESTEROL 73 05/16/2018    (goal LDL reduction with dx if diabetes is 50% LDL reduction)      PHQ Scores 5/16/2018 4/21/2017 4/8/2016   PHQ2 Score 0 1 0   PHQ9 Score 0 1 0     Interpretation of Total Score Depression Severity: 1-4 = Minimal depression, 5-9 = Mild depression, 10-14 = Moderate depression, 15-19 = Moderately severe depression, 20-27 = Severe depression          Electronically signed by JAMES Zhou CNP on 11/26/2018 at 11:32 AM

## 2018-11-27 ENCOUNTER — HOSPITAL ENCOUNTER (OUTPATIENT)
Age: 58
Setting detail: SPECIMEN
Discharge: HOME | End: 2018-11-27
Payer: COMMERCIAL

## 2018-11-27 DIAGNOSIS — E78.00 PURE HYPERCHOLESTEROLEMIA: ICD-10-CM

## 2018-11-27 DIAGNOSIS — I10 ESSENTIAL HYPERTENSION: ICD-10-CM

## 2018-11-27 DIAGNOSIS — E11.9 CONTROLLED TYPE 2 DIABETES MELLITUS WITHOUT COMPLICATION, WITHOUT LONG-TERM CURRENT USE OF INSULIN (HCC): ICD-10-CM

## 2018-11-27 LAB
ALBUMIN SERPL-MCNC: 4.4 G/DL (ref 3.5–5.2)
ALBUMIN/GLOBULIN RATIO: 1.6 (ref 1–2.5)
ALP BLD-CCNC: 96 U/L (ref 40–129)
ALT SERPL-CCNC: 40 U/L (ref 5–41)
ANION GAP SERPL CALCULATED.3IONS-SCNC: 16 MMOL/L (ref 9–17)
AST SERPL-CCNC: 31 U/L
BILIRUB SERPL-MCNC: 0.56 MG/DL (ref 0.3–1.2)
BUN BLDV-MCNC: 18 MG/DL (ref 8–23)
BUN/CREAT BLD: ABNORMAL (ref 9–20)
CALCIUM SERPL-MCNC: 10.6 MG/DL (ref 8.6–10.4)
CHLORIDE BLD-SCNC: 103 MMOL/L (ref 98–107)
CHOLESTEROL/HDL RATIO: 3.6
CHOLESTEROL: 157 MG/DL
CO2: 24 MMOL/L (ref 20–31)
CREAT SERPL-MCNC: 0.97 MG/DL (ref 0.7–1.2)
CREATININE URINE: 401.9 MG/DL (ref 39–259)
ESTIMATED AVERAGE GLUCOSE: 137 MG/DL
GFR AFRICAN AMERICAN: >60 ML/MIN
GFR NON-AFRICAN AMERICAN: >60 ML/MIN
GFR SERPL CREATININE-BSD FRML MDRD: ABNORMAL ML/MIN/{1.73_M2}
GFR SERPL CREATININE-BSD FRML MDRD: ABNORMAL ML/MIN/{1.73_M2}
GLUCOSE BLD-MCNC: 139 MG/DL (ref 70–99)
HBA1C MFR BLD: 6.4 % (ref 4–6)
HCT VFR BLD CALC: 46.8 % (ref 40.7–50.3)
HDLC SERPL-MCNC: 44 MG/DL
HEMOGLOBIN: 15.1 G/DL (ref 13–17)
LDL CHOLESTEROL: 83 MG/DL (ref 0–130)
MCH RBC QN AUTO: 29.4 PG (ref 25.2–33.5)
MCHC RBC AUTO-ENTMCNC: 32.3 G/DL (ref 28.4–34.8)
MCV RBC AUTO: 91.1 FL (ref 82.6–102.9)
MICROALBUMIN/CREAT 24H UR: 44 MG/L
MICROALBUMIN/CREAT UR-RTO: 11 MCG/MG CREAT
NRBC AUTOMATED: 0 PER 100 WBC
PDW BLD-RTO: 13.1 % (ref 11.8–14.4)
PLATELET # BLD: 184 K/UL (ref 138–453)
PMV BLD AUTO: 10.2 FL (ref 8.1–13.5)
POTASSIUM SERPL-SCNC: 4.8 MMOL/L (ref 3.7–5.3)
RBC # BLD: 5.14 M/UL (ref 4.21–5.77)
SODIUM BLD-SCNC: 143 MMOL/L (ref 135–144)
TOTAL PROTEIN: 7.2 G/DL (ref 6.4–8.3)
TRIGL SERPL-MCNC: 148 MG/DL
VLDLC SERPL CALC-MCNC: NORMAL MG/DL (ref 1–30)
WBC # BLD: 5.8 K/UL (ref 3.5–11.3)

## 2019-01-11 DIAGNOSIS — I10 ESSENTIAL HYPERTENSION: ICD-10-CM

## 2019-01-14 RX ORDER — LISINOPRIL AND HYDROCHLOROTHIAZIDE 25; 20 MG/1; MG/1
1 TABLET ORAL DAILY
Qty: 90 TABLET | Refills: 1 | Status: SHIPPED | OUTPATIENT
Start: 2019-01-14 | End: 2019-05-22 | Stop reason: SDUPTHER

## 2019-04-04 ENCOUNTER — PATIENT MESSAGE (OUTPATIENT)
Dept: PHARMACY | Facility: CLINIC | Age: 59
End: 2019-04-04

## 2019-04-04 NOTE — LETTER
Peggy Maria   [address] [facility]           05/23/19     Dear Verónica Mancilla,    Thanks so much for taking the first step towards better health. According to our records, you are missing all the following requirements that must be completed by July 1st, 2019:     1. Diabetes Visit with your physician in 2019 (First yearly visit)   2. Meet with a [facility]) clinical pharmacist by phone   Please call [phone] and select option #7 to schedule this appointment. Telephone appointments are available Monday thru Friday from 7:30 AM till 5:30 PM.  3. First A1C in 2019       You will have to submit documentation of completion of requirements if your Physician does not use the [employer] charting system or if you have your lab/urine tests done outside of [facility]. Return the documentation to [email or web address] or by fax at [phone]     This is a courtesy reminder. If you have your appointment(s) or lab work scheduled prior to the July 1st dead-line please disregard the above information. Just a reminder of the requirements to be completed between July 1 2019 and Dec. 31 2019   Diabetes Visit with your physician in 2019 (Second yearly visit)   Second A1C in 2019   Flu vaccination (once yearly)   Take diabetes medication as prescribed as well as cholesterol (Statin) or high blood pressure (ACE/ARB) medications, if needed. 70% adherence is required of diabetes medications   Provide documentation if cholesterol and/or high blood pressure medications are not needed. Lipid panel (once yearly)   Urine albumin (once yearly)   Pneumonia Vaccination (once or as indicated by physician)     Requirements if A1C is greater than 8 percent:   Engage with a [facility]) diabetes educator at one of our hospital locations or an ambulatory care coordinator by phone.      If requirements(s) are not met by the date listed above you will be disqualified from the program and the credit valued at $600 towards your diabetic medications and supplies will be revoked. You will be able to reapply the following calendar year. [address] [place] [address],[address] Team   [phone] Option #7   Email: [email or web address]   Fax Number: [phone]

## 2019-05-16 DIAGNOSIS — I10 ESSENTIAL HYPERTENSION: ICD-10-CM

## 2019-05-16 DIAGNOSIS — E78.00 PURE HYPERCHOLESTEROLEMIA: ICD-10-CM

## 2019-05-16 NOTE — TELEPHONE ENCOUNTER
[facility]) Employee Diabetes Program  =================================================================  Jyoti Patel is a 59 y.o. male enrolled in the [address] [place] [address] Diabetes Program.      Identified care gap: Patient with diabetes out of refills for ACE and statin    Pertinent Labs/Vitals:  STATIN:  Lab Results   Component Value Date    LDLCHOLESTEROL 83 11/27/2018    LDLCHOLESTEROL 73 05/16/2018    LDLCHOLESTEROL 75 10/20/2017     Lab Results   Component Value Date    ALT 40 11/27/2018      The 10-year ASCVD risk score (Diana Martinez, et al., 2013) is: 15.1%    Values used to calculate the score:      Age: 59 years      Sex: Male      Is Non- : No      Diabetic: Yes      Tobacco smoker: No      Systolic Blood Pressure: 987 mmHg      Is BP treated: Yes      HDL Cholesterol: 44 mg/dL      Total Cholesterol: 157 mg/dL    ACE/ARB:  BP Readings from Last 3 Encounters:   11/26/18 122/78   11/20/18 (!) 158/111   08/10/18 117/78      Lab Results   Component Value Date    CREATININE 0.97 11/27/2018      CrCl cannot be calculated (Patient's most recent lab result is older than the maximum 120 days allowed. ). Lab Results   Component Value Date    LABMICR 11 11/27/2018     Patient is missing the following requirements due 7/1/19:  - First OV  - First A1c  - Annual Pharmacist Appointment    Patient prescribed statin/ACE  in the past? Yes - Simvastatin 20 mg nightly (LF 2/20/19 for 90ds with no refills left) and Lisinopril-HCTZ 20-25 mg daily (LF 4/11/19 for 90ds with no refills remaining). Per chart review - Janumet  mg was last filled 4/23/19 for 30ds with no refills remaining. Will have care  contact patient to schedule pharmacist appointment, discuss other missing requirements, and see if patient would like us to process refill requests for the medications listed above.      Thank you,    Kee Smith, [facility] Pharmacist  [phone] or [phone] (Option 7)

## 2019-05-16 NOTE — TELEPHONE ENCOUNTER
Attempting to reach patient to schedule yearly pharmacist appointment, let patient know that he is missing his first OV, A1c and discuss adherence to Simvastatin 20mg and Lisinopril-HCTZ 20-25MG and to see if he would like us to process a refill request foR Janumet  mg. Left message asking for return call to toll free [phone] option 7.     [facility], toll free: [phone], option 7

## 2019-05-16 NOTE — LETTER
[address]  Phone: [phone]  Fax: [phone]        Albania Garcia   [address] [address]           05/22/19     Dear Albania Garcia,    You are currently enrolled in our Diabetes Management Program.  We tried to reach you recently regarding your Simvastatin 20mg and Lisinopril-HCTZ 20-25MG prescription, but were unable to reach you on the telephone. We have on file that you are currently taking Simvastatin 20mg and Lisinopril-HCTZ 20-25MG. If you are no longer taking it or taking it differently than above, please call us at the number below so that we can discuss this and update your medication profile. As a reminder, the copay for the medicine(s) listed above is covered under the program (up to $600) if filled through the [address] It appears we are missing the following requirements that are due July 1, 2019:  - First office visit with your provider for diabetes management  - First A1c  - Annual pharmacist appointment (Telephone appointments are available Monday thru Friday from 7:30 AM till 5:30 PM EST)    Please call [phone] and select option #7 to discuss your medications and/or the missing requirements listed above. Thank you,    [address]  Phone: [phone] Option #7  Fax: [phone]  Email: [email or web address]

## 2019-05-22 RX ORDER — SIMVASTATIN 20 MG
20 TABLET ORAL NIGHTLY
Qty: 90 TABLET | Refills: 0 | Status: SHIPPED | OUTPATIENT
Start: 2019-05-22 | End: 2019-09-03 | Stop reason: SDUPTHER

## 2019-05-22 RX ORDER — LISINOPRIL AND HYDROCHLOROTHIAZIDE 25; 20 MG/1; MG/1
1 TABLET ORAL DAILY
Qty: 90 TABLET | Refills: 0 | Status: SHIPPED | OUTPATIENT
Start: 2019-05-22 | End: 2019-10-10 | Stop reason: SDUPTHER

## 2019-05-22 NOTE — TELEPHONE ENCOUNTER
2nd attempt to contact this patient regarding the previous message    CLINICAL PHARMACY: [facility]  Patient unavailable at the time of call. Left following message on home TAD: please call back at toll-free [phone] option 7 to retrieve previous message. Letter mailed to patient.     [facility], toll free: [phone], option 7

## 2019-05-22 NOTE — TELEPHONE ENCOUNTER
Patient returned call. He has completed his annual tele appointment. Advised him of the following outstanding items:  Office visit  A1C    He went to eye doctor today. Plans to complete [facility] and ask for A1c. Discussed adherence with his meds. He will need a new rx for Janumet. Advised him it was sent to [facility].

## 2019-05-23 NOTE — TELEPHONE ENCOUNTER
Aranza message previously not read by patient. Patient is still missing the following requirement for the DM Program that is due by July 1st, 2019:  1. Diabetes Visit with your physician in 2019 (First yearly visit)   2. Meet with a [facility]) clinical pharmacist in person at a hospital location or by phone   3. First A1C in 2019     Reminder letter mailed to patient.     Roger Sr, [address]   Department, toll free: [phone], option 7

## 2019-05-24 DIAGNOSIS — E11.9 CONTROLLED TYPE 2 DIABETES MELLITUS WITHOUT COMPLICATION, WITHOUT LONG-TERM CURRENT USE OF INSULIN (HCC): ICD-10-CM

## 2019-05-24 RX ORDER — SITAGLIPTIN AND METFORMIN HYDROCHLORIDE 1000; 50 MG/1; MG/1
TABLET, FILM COATED ORAL
Qty: 60 TABLET | Refills: 5 | OUTPATIENT
Start: 2019-05-24

## 2019-05-24 NOTE — TELEPHONE ENCOUNTER
Patient called stating he needs his Janumet refilled. Processed refill through mail order, but then he said he wants it at [facility] to  on Tuesday. Called mail order and had them back out the [facility] and then refilled via [facility] in [address] [place] [address].      Linsey Simpson, PharmD  [facility] Pharmacist  [phone] or [phone] (Option 7)

## 2019-05-28 ENCOUNTER — SCHEDULED TELEPHONE ENCOUNTER (OUTPATIENT)
Dept: PHARMACY | Facility: CLINIC | Age: 59
End: 2019-05-28

## 2019-05-28 ENCOUNTER — OFFICE VISIT (OUTPATIENT)
Dept: UROLOGY | Age: 59
End: 2019-05-28
Payer: COMMERCIAL

## 2019-05-28 VITALS
SYSTOLIC BLOOD PRESSURE: 115 MMHG | WEIGHT: 251 LBS | DIASTOLIC BLOOD PRESSURE: 82 MMHG | BODY MASS INDEX: 35.14 KG/M2 | HEART RATE: 82 BPM | HEIGHT: 71 IN | TEMPERATURE: 97.7 F

## 2019-05-28 DIAGNOSIS — R97.20 ELEVATED PSA: ICD-10-CM

## 2019-05-28 DIAGNOSIS — N40.0 BPH WITHOUT OBSTRUCTION/LOWER URINARY TRACT SYMPTOMS: ICD-10-CM

## 2019-05-28 PROCEDURE — 99213 OFFICE O/P EST LOW 20 MIN: CPT | Performed by: UROLOGY

## 2019-05-28 RX ORDER — LANCETS 33 GAUGE
EACH MISCELLANEOUS
Qty: 100 EACH | Refills: 3 | Status: SHIPPED | OUTPATIENT
Start: 2019-05-28 | End: 2020-06-15

## 2019-05-28 ASSESSMENT — ENCOUNTER SYMPTOMS
COUGH: 0
EYE PAIN: 0
NAUSEA: 0
BACK PAIN: 0
ABDOMINAL PAIN: 0
DIARRHEA: 0
CONSTIPATION: 0
SHORTNESS OF BREATH: 0
VOMITING: 0
EYE REDNESS: 0
WHEEZING: 0

## 2019-05-28 NOTE — TELEPHONE ENCOUNTER
LOV: 11/26/2018  LRF: 5/18/2018  RTO: Scheduled    Health Maintenance   Topic Date Due    Shingles Vaccine (1 of 2) 07/24/2008    Diabetic retinal exam  10/18/2018    Diabetic foot exam  11/26/2019    A1C test (Diabetic or Prediabetic)  11/27/2019    Diabetic microalbuminuria test  11/27/2019    Lipid screen  11/27/2019    Potassium monitoring  11/27/2019    Creatinine monitoring  11/27/2019    Colon cancer screen colonoscopy  01/10/2023    DTaP/Tdap/Td vaccine (2 - Td) 07/10/2023    Flu vaccine  Completed    Pneumococcal 0-64 years Vaccine  Completed    Hepatitis C screen  Completed    HIV screen  Completed             (applicable per patient's age: Cancer Screenings, Depression Screening, Fall Risk Screening, Immunizations)    Hemoglobin A1C (%)   Date Value   11/27/2018 6.4 (H)   05/16/2018 6.2 (H)   10/20/2017 6.9 (H)     Microalb/Crt.  Ratio (mcg/mg creat)   Date Value   11/27/2018 11     LDL Cholesterol (mg/dL)   Date Value   11/27/2018 83     AST (U/L)   Date Value   11/27/2018 31     ALT (U/L)   Date Value   11/27/2018 40     BUN (mg/dL)   Date Value   11/27/2018 18      (goal A1C is < 7)   (goal LDL is <100) need 30-50% reduction from baseline     BP Readings from Last 3 Encounters:   11/26/18 122/78   11/20/18 (!) 158/111   08/10/18 117/78    (goal /80)      All Future Testing planned in [facility]:  Lab Frequency Next Occurrence   PSA, Diagnostic Once 05/28/2019       Next Visit Date:  Future Appointments   Date Time Provider Kyaw Cramer   5/28/2019  3:10 PM Rufus Tam  [facility]   5/28/2019  5:00 PM SCHEDULE, S CLINICAL PHARMACY S Clin Rx None   6/4/2019 10:00 AM Margaret Martins, APRN - CNP [facility]            Patient Active Problem List:     Hypertension     Hyperlipidemia     Elevated liver enzymes     BPH (benign prostatic hyperplasia)     Diabetic macular edema (HCC)     Diabetes mellitus type II, controlled ([address]     Elevated PSA

## 2019-05-28 NOTE — PROGRESS NOTES
[facility]  [facility] [place]  [address]  [address]  [address] [facility] [address]  Dept: [address] Jim[address] [facility] Urology Office Note - Established    Patient:  Magda Cote  YOB: 1960  Date: 5/28/2019    The patient is a 59 y.o. male who presents todayfor evaluation of the following problems:   Chief Complaint   Patient presents with    Elevated PSA     6 mnth f/u       HPI  Here for follow up on elevated PSA. He did not have PSA drawn prior tot his visit. He has had 2 negative biopsies in the past with former Urologist. His PSA 4/2018 7.29, May 5/2018- 8.56, recheck 11/2018- 6.79. MRI- 7/2/18 Pi-RADS 1 and 2. He has no known Hx of prostate cancer. He has nocturia x1, empties well, no dysuria, no blood, steady stream.     Summary of old records: N/A    Additional History: N/A    Procedures Today: N/A    Urinalysis today:  No results found for this visit on 05/28/19.   Last several PSA's:  Lab Results   Component Value Date    PSA 6.79 (H) 11/16/2018    PSA 8.56 (H) 05/16/2018    PSA 7.29 (H) 04/26/2017     Last total testosterone:  No results found for: TESTOSTERONE    AUA Symptom Score (5/28/2019):  INCOMPLETE EMPTYING: How often have you had the sensation of not emptying your bladder?: Not at all  FREQUENCY: How often do you have to urinate less than every two hours?: Not at all  INTERMITTENCY: How often have you found you stopped and started again several times when you urinated?: Not at all  URGENCY: How often have you found it difficult to postpone urination?: Not at all  WEAK STREAM: How often have you had a weak urinary stream?: Not at all  STRAINING: How often have you had to strain to start  urination?: Not at all  NOCTURIA: How many times did you typically get up at night to uriniate?: 1 Time  TOTAL I-PSS SCORE[de-identified] 1  How would you feel if you were to spend the rest of your life with your urinary condition?: Pleased    Last BUN and creatinine:  Lab Results Component Value Date    BUN 18 2018     Lab Results   Component Value Date    CREATININE 0.97 2018       Additional Lab/Culture results: none    Imaging Reviewed during this Office Visit: MRI was PI-RADS 1/2.   (results were independently reviewed by physician and radiology report verified)    PAST MEDICAL, FAMILY AND SOCIAL HISTORY UPDATE:  Past Medical History:   Diagnosis Date    Allergic rhinitis     BPH (benign prostatic hyperplasia)     Diabetes mellitus ([address]     Hyperlipidemia     Hypertension     Type II or unspecified type diabetes mellitus without mention of complication, not stated as uncontrolled      Past Surgical History:   Procedure Laterality Date    COLONOSCOPY  2013    polypectomy bx adenomatous polyp    PROSTATE BIOPSY  Dec 2012, aug 2013    normal     Family History   Problem Relation Age of Onset    Heart Disease Mother         CAD s/p stents    Heart Disease Father         CAD,  at 72 of MI    High Blood Pressure Father      Outpatient Medications Marked as Taking for the 19 encounter (Office Visit) with Mahendra Saldaña MD   Medication Sig Dispense Refill    AGAMATRIX ULTRA-THIN LANCETS MISC USE ONCE DAILY TO TEST BLOOD GLUCOSE 100 each 3    sitaGLIPtan-metformin (JANUMET)  MG per tablet Take 1 tablet by mouth 2 times daily (with meals) 180 tablet 0    simvastatin (ZOCOR) 20 MG tablet Take 1 tablet by mouth nightly 90 tablet 0    aspirin 81 MG tablet Take 1 tablet by mouth daily 90 tablet 0    lisinopril-hydrochlorothiazide (PRINZIDE;ZESTORETIC) 20-25 MG per tablet Take 1 tablet by mouth daily 90 tablet 0    blood glucose test strips (AGAMATRIX PRESTO TEST) strip Use once daily as directed to test blood glucose 100 strip 3    Blood Glucose Monitoring Suppl (AGAMATRIX PRESTO PRO METER) XOCHILT Use as directed 1 Device 0    multivitamin (THERAGRAN) per tablet Take 1 tablet by mouth daily.            Chocolate  Social History     Tobacco Use

## 2019-05-28 NOTE — TELEPHONE ENCOUNTER
[facility] Employee Diabetes Program    Rach Crum is a 59 y.o. male enrolled in the [address] [place] [address] Diabetes Program.    Medications:  Current Outpatient Medications   Medication Sig Dispense Refill    AGAMATRIX ULTRA-THIN LANCETS MISC USE ONCE DAILY TO TEST BLOOD GLUCOSE 100 each 3    sitaGLIPtan-metformin (JANUMET)  MG per tablet Take 1 tablet by mouth 2 times daily (with meals) 180 tablet 0    simvastatin (ZOCOR) 20 MG tablet Take 1 tablet by mouth nightly 90 tablet 0    aspirin 81 MG tablet Take 1 tablet by mouth daily 90 tablet 0    lisinopril-hydrochlorothiazide (PRINZIDE;ZESTORETIC) 20-25 MG per tablet Take 1 tablet by mouth daily 90 tablet 0    blood glucose test strips (AGAMATRIX PRESTO TEST) strip Use once daily as directed to test blood glucose 100 strip 3    Blood Glucose Monitoring Suppl (AGAMATRIX PRESTO PRO METER) XOCHILT Use as directed 1 Device 0    multivitamin (THERAGRAN) per tablet Take 1 tablet by mouth daily.  Lancets MISC As needed. 100 each 3     No current facility-administered medications for this visit. Current Pharmacy: [facility], [facility]  Current testing supplies/frequency: Agamatrix once daily, interested in switching to Prodigy  Pen needles/syringes: n/a    Allergies:   Allergen Reactions    Chocolate Other (See Comments)     headaches      Vitals/Labs:  BP Readings from Last 3 Encounters:   05/28/19 115/82   11/26/18 122/78   11/20/18 (!) 158/111     Microalbumin   Component Value Date    LABMICR 11 11/27/2018     A1c   Component Value Date    LABA1C 6.4 (H) 11/27/2018    LABA1C 6.2 (H) 05/16/2018    LABA1C 6.9 (H) 10/20/2017     Lipids   Component Value Date    CHOL 157 11/27/2018    TRIG 148 11/27/2018    HDL 44 11/27/2018    LDLCHOLESTEROL 83 11/27/2018     ALT   Date Value Ref Range Status   11/27/2018 40 5 - 41 U/L Final     AST   Date Value Ref Range Status   11/27/2018 31 <40 U/L Final     The 10-year ASCVD risk score (Juan Francisco Mart, et al., 2013) is: 13.7%    Values used to calculate the score:      Age: 59 years      Sex: Male      Is Non- : No      Diabetic: Yes      Tobacco smoker: No      Systolic Blood Pressure: 899 mmHg      Is BP treated: Yes      HDL Cholesterol: 44 mg/dL      Total Cholesterol: 157 mg/dL     SCr   Component Value Date    CREATININE 0.97 2018     CrCL ~104 mL/min (calculated using sCr 0.97 mg/dL, Ht 1.803 m, Adj. 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   Substance Use Topics    Alcohol use: No     Alcohol/week: 0.0 oz     Comment: used to be heavy drinker, quit 20 yrs ago     ASSESSMENT:  Initial Program Requirements (Y indicates has completed for the year, N indicates needs to be completed by 2019): No - OV with provider for DM (1st) - upcoming appt 2019  No - A1c (1st)  Yes - On statin or contraindication(s) - simvastatin  Yes - On ACEi/ARB or contraindication(s) - lisinopril-hctz      Ongoing Program Requirements (Y indicates has completed for the year, N indicates needs to be completed by 2019):   No - OV with provider for DM (2nd)  No - ACC/diabetes educator visit (if A1c over 8%) - not currently needed  No - A1c (2nd)  No - Lipid panel  No - Urine microalbumin  Yes - Pneumococcal vaccination: Up-to-date (not needed again until age 70)  No - Influenza vaccination for Fall 2019  No - Medication adherence over 70%    Formulary Medication Review:  Non-formulary or medications with cost-effective alternatives: n/a. Current medications eligible for copay waiver, up to $600, through [facility] ([facility]) (mail order) Pharmacy:  - Lisinopril-hctz, simvastatin, aspirin (currently getting Janumet at [facility] as only fills 30ds d/t coupon, does not want to get at mail order)  - Prodigy meter and supplies     Diabetes Care:   - Glycemic Goal: <7.0%. Is at blood glucose goal.  - Home blood sugar records:  this AM = 119  - Any episodes of hypoglycemia? no  - Medication compliance: compliant all of the time  - Adherent to ACEi/ARB and/or statin: Yes - verified via MedImpact    Other Considerations:  - Blood Pressure Goal: BP less than 140/90 mmHg due to history of DM: Is at blood pressure goal.   - Lipids: Patient is prescribed moderate-intensity statin therapy.   - Smoking status: Former smoker  - Other: n/a    PLAN:  - Consideration(s) for provider:   · Consider issuing prescriptions for formulary testing supplies so patient can utilize copay waiver benefit of program.  - DM program gaps identified:   · Initial requirements: OV with provider for DM (1st) and A1c (1st)   · Ongoing requirements: OV with provider for DM (2nd), A1c (2nd), Lipid panel, Urine microalbumin, Influenza vaccination for 1251-2054 and Medication adherence over 70%   - Education to patient: program review, prodigy testing supplies, recent BG readings   - Follow up: PCP for identified gaps or as scheduled below  - Upcoming appointments:   Date Time Provider Kyaw Cramer   6/4/2019 10:00 AM Beatriz Murphy, PharmD, [facility], [address] Specialist  O: [phone]  C: [address]  [phone], Option 7    =======================================================    For Pharmacy Admin Tracking Only  PHSO: Yes  Total # of Interventions Recommended: 2  - Refills Provided #: 1  - Updated Order #: 1 Updated Order Reason(s):  Other  Recommended intervention potential cost savings: 1  Total Interventions Accepted: 2  Time Spent (min): 30

## 2019-05-29 NOTE — TELEPHONE ENCOUNTER
MINERVA BREWER, APRN - CNP,     Your patient is currently enrolled in [address] After your patient's recent visit with a Angi R E Cata Chan Se, the below were identified as opportunities to assist with their diabetes management (if patient is not eligible for below recommendations, please reply with reason/contraindication):   · Consider issuing prescriptions for formulary testing supplies so patient can utilize copay waiver benefit of program. Orders have been pended for you convenience. See pharmacist note dated 05/28/2019 for complete details. To remain active in the program, the patient is to meet the requirements and documentation must be provided by pre-established deadlines (see below).        Program Requirements  Initial Program Requirements (to be completed by 07/01/2019):  · Office visit with provider for DM (1st)  · A1c (1st)    Ongoing Program Requirements (to be completed by 12/31/2019):  · Office visit with provider for DM (2nd)  · A1c (2nd)  · Lipid panel  · Urine microalbumin   · Influenza vaccination for Fall 2019    Thank you,  Ran Fairbanks, PharmD, [place], [address] [facility] Specialist  O: [phone]  C: [address], [address] 7

## 2019-05-31 RX ORDER — BLOOD-GLUCOSE METER
EACH MISCELLANEOUS
Qty: 1 KIT | Refills: 0 | Status: SHIPPED | OUTPATIENT
Start: 2019-05-31

## 2019-05-31 RX ORDER — BLOOD-GLUCOSE CONTROL, LOW
EACH MISCELLANEOUS
Qty: 100 EACH | Refills: 3 | Status: SHIPPED | OUTPATIENT
Start: 2019-05-31 | End: 2021-08-09

## 2019-06-04 ENCOUNTER — HOSPITAL ENCOUNTER (OUTPATIENT)
Age: 59
Setting detail: SPECIMEN
Discharge: HOME | End: 2019-06-04
Payer: COMMERCIAL

## 2019-06-04 ENCOUNTER — OFFICE VISIT (OUTPATIENT)
Dept: FAMILY MEDICINE CLINIC | Age: 59
End: 2019-06-04
Payer: COMMERCIAL

## 2019-06-04 VITALS
HEIGHT: 71 IN | TEMPERATURE: 97.4 F | DIASTOLIC BLOOD PRESSURE: 64 MMHG | HEART RATE: 84 BPM | WEIGHT: 253.2 LBS | BODY MASS INDEX: 35.45 KG/M2 | SYSTOLIC BLOOD PRESSURE: 98 MMHG | OXYGEN SATURATION: 97 %

## 2019-06-04 DIAGNOSIS — E78.00 PURE HYPERCHOLESTEROLEMIA: ICD-10-CM

## 2019-06-04 DIAGNOSIS — R97.20 ELEVATED PSA: ICD-10-CM

## 2019-06-04 DIAGNOSIS — E11.311 DIABETIC MACULAR EDEMA (HCC): ICD-10-CM

## 2019-06-04 DIAGNOSIS — I10 ESSENTIAL HYPERTENSION: ICD-10-CM

## 2019-06-04 DIAGNOSIS — E11.9 CONTROLLED TYPE 2 DIABETES MELLITUS WITHOUT COMPLICATION, WITHOUT LONG-TERM CURRENT USE OF INSULIN (HCC): Primary | ICD-10-CM

## 2019-06-04 DIAGNOSIS — E11.9 CONTROLLED TYPE 2 DIABETES MELLITUS WITHOUT COMPLICATION, WITHOUT LONG-TERM CURRENT USE OF INSULIN (HCC): ICD-10-CM

## 2019-06-04 LAB
ALBUMIN SERPL-MCNC: 4.2 G/DL (ref 3.5–5.2)
ALBUMIN/GLOBULIN RATIO: 1.4 (ref 1–2.5)
ALP BLD-CCNC: 80 U/L (ref 40–129)
ALT SERPL-CCNC: 34 U/L (ref 5–41)
ANION GAP SERPL CALCULATED.3IONS-SCNC: 13 MMOL/L (ref 9–17)
AST SERPL-CCNC: 24 U/L
BILIRUB SERPL-MCNC: 0.6 MG/DL (ref 0.3–1.2)
BUN BLDV-MCNC: 14 MG/DL (ref 8–23)
BUN/CREAT BLD: NORMAL (ref 9–20)
CALCIUM SERPL-MCNC: 9.4 MG/DL (ref 8.6–10.4)
CHLORIDE BLD-SCNC: 102 MMOL/L (ref 98–107)
CHOLESTEROL/HDL RATIO: 3.1
CHOLESTEROL: 141 MG/DL
CO2: 23 MMOL/L (ref 20–31)
CREAT SERPL-MCNC: 0.75 MG/DL (ref 0.7–1.2)
GFR AFRICAN AMERICAN: >60 ML/MIN
GFR NON-AFRICAN AMERICAN: >60 ML/MIN
GFR SERPL CREATININE-BSD FRML MDRD: NORMAL ML/MIN/{1.73_M2}
GFR SERPL CREATININE-BSD FRML MDRD: NORMAL ML/MIN/{1.73_M2}
GLUCOSE BLD-MCNC: 91 MG/DL (ref 70–99)
HDLC SERPL-MCNC: 45 MG/DL
LDL CHOLESTEROL: 80 MG/DL (ref 0–130)
POTASSIUM SERPL-SCNC: 4.2 MMOL/L (ref 3.7–5.3)
PROSTATE SPECIFIC ANTIGEN: 6.71 UG/L
SODIUM BLD-SCNC: 138 MMOL/L (ref 135–144)
TOTAL PROTEIN: 7.3 G/DL (ref 6.4–8.3)
TRIGL SERPL-MCNC: 80 MG/DL
VLDLC SERPL CALC-MCNC: NORMAL MG/DL (ref 1–30)

## 2019-06-04 PROCEDURE — 99214 OFFICE O/P EST MOD 30 MIN: CPT | Performed by: NURSE PRACTITIONER

## 2019-06-04 ASSESSMENT — ENCOUNTER SYMPTOMS
RHINORRHEA: 1
ABDOMINAL PAIN: 0
WHEEZING: 0
DIARRHEA: 0
COUGH: 0
EYE DISCHARGE: 0
SORE THROAT: 0
TROUBLE SWALLOWING: 0
NAUSEA: 0
BACK PAIN: 0
CONSTIPATION: 0
SHORTNESS OF BREATH: 0
EYE PAIN: 0
VOMITING: 0
BLURRED VISION: 0

## 2019-06-04 ASSESSMENT — PATIENT HEALTH QUESTIONNAIRE - PHQ9
SUM OF ALL RESPONSES TO PHQ QUESTIONS 1-9: 1
1. LITTLE INTEREST OR PLEASURE IN DOING THINGS: 1
SUM OF ALL RESPONSES TO PHQ9 QUESTIONS 1 & 2: 1
2. FEELING DOWN, DEPRESSED OR HOPELESS: 0
SUM OF ALL RESPONSES TO PHQ QUESTIONS 1-9: 1

## 2019-06-04 NOTE — PROGRESS NOTES
Subjective:      Visit Information    Have you changed or started any medications since your last visit including any over-the-counter medicines, vitamins, or herbal medicines? no   Are you having any side effects from any of your medications? -  no  Have you stopped taking any of your medications? Is so, why? -  no    Have you seen any other physician or provider since your last visit? Yes - UrologistTamiko (Natalie) - Eye Exam  Have you had any other diagnostic tests since your last visit? No  Have you been seen in the emergency room and/or had an admission to a hospital since we last saw you? No  Have you had your routine dental cleaning in the past 6 months? no    Have you activated your [employer] account? If not, what are your barriers?  Yes     Patient Care Team:  JAMES Galan CNP as PCP - General (Family Medicine)  JAMES Galan CNP [facility] PCP [facility]    Medical History Review  Past Medical, Family, and Social History reviewed and does contribute to the patient presenting condition    Health Maintenance   Topic Date Due    Shingles Vaccine (1 of 2) 07/24/2008    Diabetic retinal exam  10/18/2018    Diabetic foot exam  11/26/2019    A1C test (Diabetic or Prediabetic)  11/27/2019    Diabetic microalbuminuria test  11/27/2019    Lipid screen  11/27/2019    Potassium monitoring  11/27/2019    Creatinine monitoring  11/27/2019    Colon cancer screen colonoscopy  01/10/2023    DTaP/Tdap/Td vaccine (2 - Td) 07/10/2023    Flu vaccine  Completed    Pneumococcal 0-64 years Vaccine  Completed    Hepatitis C screen  Completed    HIV screen  Completed       Current Outpatient Medications   Medication Sig Dispense Refill    Blood Glucose Monitoring Suppl (PRODIGY AUTOCODE BLOOD GLUCOSE) w/Device KIT Use to test once daily and as needed as directed by provider 1 kit 0    PRODIGY LANCETS 28G MISC Use to test once daily and as needed as directed by provider 100 each 3    blood glucose test strips (PRODIGY NO CODING BLOOD GLUC) strip Use to test once daily and as needed as directed by provider 100 each 3    AGAMATRIX ULTRA-THIN LANCETS MISC USE ONCE DAILY TO TEST BLOOD GLUCOSE 100 each 3    sitaGLIPtan-metformin (JANUMET)  MG per tablet Take 1 tablet by mouth 2 times daily (with meals) 180 tablet 0    simvastatin (ZOCOR) 20 MG tablet Take 1 tablet by mouth nightly 90 tablet 0    aspirin 81 MG tablet Take 1 tablet by mouth daily 90 tablet 0    lisinopril-hydrochlorothiazide (PRINZIDE;ZESTORETIC) 20-25 MG per tablet Take 1 tablet by mouth daily 90 tablet 0    multivitamin (THERAGRAN) per tablet Take 1 tablet by mouth daily. No current facility-administered medications for this visit. Patient ID: César Cruz is a 59 y.o. male. Patient presents in office today for routine follow up on chronic conditions including hypertension, hyperlipidemia, diabetes and BPH. Going to have blood work for urologist today. Had elevated PSA. Had two negative biopsies. told if this PSA looks good, will not need seen for 1 year. Saw urologist last week. Saw eye doctor- needs follow up in 6 months. Checks blood sugars every morning. Notices if his blood sugar is on lower end, he feels more tired and sleeps more. Usually 120-130s unless he cheats then 160-180s. Diabetes   He has type 2 diabetes mellitus. His disease course has been stable. Hypoglycemia symptoms include sleepiness. Pertinent negatives for hypoglycemia include no dizziness. Associated symptoms include fatigue (if sugar low). Pertinent negatives for diabetes include no blurred vision, no chest pain, no polyuria and no weakness. Pertinent negatives for hypoglycemia complications include no blackouts. Risk factors for coronary artery disease include dyslipidemia, family history, diabetes mellitus, hypertension, male sex, obesity and sedentary lifestyle.  Current diabetic treatment includes oral agent (dual exudate, posterior oropharyngeal edema or posterior oropharyngeal erythema. Eyes: Right eye exhibits no discharge. Left eye exhibits no discharge. Neck: Neck supple. Cardiovascular: Normal rate, regular rhythm and normal heart sounds. Pulses:       Radial pulses are 2+ on the right side, and 2+ on the left side. Pulmonary/Chest: Effort normal and breath sounds normal. No respiratory distress. He has no wheezes. He has no rales. Abdominal: Soft. Bowel sounds are normal. He exhibits no distension. There is no tenderness. There is no guarding. Musculoskeletal: He exhibits no edema. No red or swollen joints   Neurological: He is alert and oriented to person, place, and time. Skin: Skin is warm and dry. No rash noted. Psychiatric: He has a normal mood and affect. His behavior is normal.   Nursing note and vitals reviewed. Assessment:      Diagnosis Orders   1. Controlled type 2 diabetes mellitus without complication, without long-term current use of insulin (HCC)  Lipid Panel    Comprehensive Metabolic Panel    Hemoglobin A1C   2. Essential hypertension  Lipid Panel    Comprehensive Metabolic Panel   3. Pure hypercholesterolemia  Lipid Panel    Comprehensive Metabolic Panel   4. Elevated PSA     5. Diabetic macular edema (HCC)         Plan:     Complete routine labs today. Will also obtain PSA for urologist.   Continue medications as prescribed. May need to lower Janumet dosing if blood sugars well controlled. Recommend healthy diet and exercise  Will obtain copy of eye exam  Does not remember having chickenpox so will hold off on getting Shingles vaccine. Follow up with specialists as planned- eye doctor and urologist  Diabetic macular edema- stable. Follow up annually.    Call office with concerns      Analy Bryant received counseling on the following healthy behaviors: nutrition, exercise and medication adherence  Reviewed prior labs and health maintenance  Continue current medications, diet and

## 2019-06-05 LAB
ESTIMATED AVERAGE GLUCOSE: 131 MG/DL
HBA1C MFR BLD: 6.2 % (ref 4–6)

## 2019-08-23 RX ORDER — SITAGLIPTIN AND METFORMIN HYDROCHLORIDE 1000; 50 MG/1; MG/1
TABLET, FILM COATED ORAL
Qty: 180 TABLET | Refills: 0 | Status: SHIPPED | OUTPATIENT
Start: 2019-08-23 | End: 2019-10-24 | Stop reason: SDUPTHER

## 2019-09-03 DIAGNOSIS — E78.00 PURE HYPERCHOLESTEROLEMIA: ICD-10-CM

## 2019-09-03 RX ORDER — SIMVASTATIN 20 MG
20 TABLET ORAL NIGHTLY
Qty: 90 TABLET | Refills: 1 | Status: SHIPPED | OUTPATIENT
Start: 2019-09-03 | End: 2020-03-10

## 2019-09-03 RX ORDER — ASPIRIN 81 MG/1
81 TABLET ORAL DAILY
Qty: 90 TABLET | Refills: 1 | Status: SHIPPED | OUTPATIENT
Start: 2019-09-03 | End: 2020-03-10

## 2019-10-10 DIAGNOSIS — I10 ESSENTIAL HYPERTENSION: ICD-10-CM

## 2019-10-11 RX ORDER — LISINOPRIL AND HYDROCHLOROTHIAZIDE 25; 20 MG/1; MG/1
1 TABLET ORAL DAILY
Qty: 90 TABLET | Refills: 1 | Status: SHIPPED | OUTPATIENT
Start: 2019-10-11 | End: 2020-04-13

## 2019-11-14 ENCOUNTER — TELEPHONE (OUTPATIENT)
Dept: PHARMACY | Facility: CLINIC | Age: 59
End: 2019-11-14

## 2019-12-03 ENCOUNTER — OFFICE VISIT (OUTPATIENT)
Dept: FAMILY MEDICINE CLINIC | Age: 59
End: 2019-12-03
Payer: COMMERCIAL

## 2019-12-03 ENCOUNTER — HOSPITAL ENCOUNTER (OUTPATIENT)
Age: 59
Setting detail: SPECIMEN
Discharge: HOME | End: 2019-12-03
Payer: COMMERCIAL

## 2019-12-03 VITALS
RESPIRATION RATE: 18 BRPM | DIASTOLIC BLOOD PRESSURE: 82 MMHG | TEMPERATURE: 98.2 F | SYSTOLIC BLOOD PRESSURE: 128 MMHG | HEART RATE: 76 BPM | BODY MASS INDEX: 36.4 KG/M2 | WEIGHT: 261 LBS

## 2019-12-03 DIAGNOSIS — E78.00 PURE HYPERCHOLESTEROLEMIA: ICD-10-CM

## 2019-12-03 DIAGNOSIS — I10 ESSENTIAL HYPERTENSION: ICD-10-CM

## 2019-12-03 DIAGNOSIS — E11.9 CONTROLLED TYPE 2 DIABETES MELLITUS WITHOUT COMPLICATION, WITHOUT LONG-TERM CURRENT USE OF INSULIN (HCC): ICD-10-CM

## 2019-12-03 DIAGNOSIS — E11.9 CONTROLLED TYPE 2 DIABETES MELLITUS WITHOUT COMPLICATION, WITHOUT LONG-TERM CURRENT USE OF INSULIN (HCC): Primary | ICD-10-CM

## 2019-12-03 LAB
ALBUMIN SERPL-MCNC: 4.3 G/DL (ref 3.5–5.2)
ALBUMIN/GLOBULIN RATIO: 1.7 (ref 1–2.5)
ALP BLD-CCNC: 79 U/L (ref 40–129)
ALT SERPL-CCNC: 46 U/L (ref 5–41)
ANION GAP SERPL CALCULATED.3IONS-SCNC: 13 MMOL/L (ref 9–17)
AST SERPL-CCNC: 29 U/L
BILIRUB SERPL-MCNC: 0.48 MG/DL (ref 0.3–1.2)
BUN BLDV-MCNC: 14 MG/DL (ref 8–23)
BUN/CREAT BLD: ABNORMAL (ref 9–20)
CALCIUM SERPL-MCNC: 9.5 MG/DL (ref 8.6–10.4)
CHLORIDE BLD-SCNC: 102 MMOL/L (ref 98–107)
CHOLESTEROL/HDL RATIO: 3.4
CHOLESTEROL: 159 MG/DL
CO2: 25 MMOL/L (ref 20–31)
CREAT SERPL-MCNC: 0.79 MG/DL (ref 0.7–1.2)
CREATININE URINE: 131.4 MG/DL (ref 39–259)
ESTIMATED AVERAGE GLUCOSE: 143 MG/DL
GFR AFRICAN AMERICAN: >60 ML/MIN
GFR NON-AFRICAN AMERICAN: >60 ML/MIN
GFR SERPL CREATININE-BSD FRML MDRD: ABNORMAL ML/MIN/{1.73_M2}
GFR SERPL CREATININE-BSD FRML MDRD: ABNORMAL ML/MIN/{1.73_M2}
GLUCOSE BLD-MCNC: 124 MG/DL (ref 70–99)
HBA1C MFR BLD: 6.6 % (ref 4–6)
HCT VFR BLD CALC: 46.3 % (ref 40.7–50.3)
HDLC SERPL-MCNC: 47 MG/DL
HEMOGLOBIN: 14.9 G/DL (ref 13–17)
LDL CHOLESTEROL: 94 MG/DL (ref 0–130)
MCH RBC QN AUTO: 29.4 PG (ref 25.2–33.5)
MCHC RBC AUTO-ENTMCNC: 32.2 G/DL (ref 28.4–34.8)
MCV RBC AUTO: 91.3 FL (ref 82.6–102.9)
MICROALBUMIN/CREAT 24H UR: <12 MG/L
MICROALBUMIN/CREAT UR-RTO: NORMAL MCG/MG CREAT
NRBC AUTOMATED: 0 PER 100 WBC
PDW BLD-RTO: 13.2 % (ref 11.8–14.4)
PLATELET # BLD: 179 K/UL (ref 138–453)
PMV BLD AUTO: 10.5 FL (ref 8.1–13.5)
POTASSIUM SERPL-SCNC: 4.9 MMOL/L (ref 3.7–5.3)
RBC # BLD: 5.07 M/UL (ref 4.21–5.77)
SODIUM BLD-SCNC: 140 MMOL/L (ref 135–144)
TOTAL PROTEIN: 6.9 G/DL (ref 6.4–8.3)
TRIGL SERPL-MCNC: 92 MG/DL
VLDLC SERPL CALC-MCNC: NORMAL MG/DL (ref 1–30)
WBC # BLD: 5.2 K/UL (ref 3.5–11.3)

## 2019-12-03 PROCEDURE — 99214 OFFICE O/P EST MOD 30 MIN: CPT | Performed by: NURSE PRACTITIONER

## 2019-12-03 ASSESSMENT — ENCOUNTER SYMPTOMS
SHORTNESS OF BREATH: 0
EYE DISCHARGE: 0
COUGH: 0
BACK PAIN: 0
VOMITING: 0
EYE PAIN: 0
DIARRHEA: 0
ABDOMINAL PAIN: 0
WHEEZING: 0
SORE THROAT: 0
NAUSEA: 0
TROUBLE SWALLOWING: 0
VISUAL CHANGE: 0
CONSTIPATION: 0
RHINORRHEA: 0

## 2020-02-13 ENCOUNTER — TELEPHONE (OUTPATIENT)
Dept: PHARMACY | Facility: CLINIC | Age: 60
End: 2020-02-13

## 2020-02-13 NOTE — TELEPHONE ENCOUNTER
Pharmacy Pop Care Documentation:   2020 Annual Pharmacy  Visit    Called patient to complete yearly pharmacy appointment to discuss the 2020 Diabetes Management Program.    No answer. Left VM on home TAD: Please call back at [phone] Option #7.      [address]   Department, toll free: [phone], option 7

## 2020-02-25 ENCOUNTER — TELEPHONE (OUTPATIENT)
Dept: PHARMACY | Facility: CLINIC | Age: 60
End: 2020-02-25

## 2020-02-25 NOTE — LETTER
Khris Ibarra   [address] [facility]           02/25/20     Dear Natalia Diaz,      You are currently enrolled in the [address] [place] [address]. After your recent 2020 Annual Pharmacy  Visit, the below were identified as opportunities to assist you with your diabetes management:      Current medications eligible for copay waiver, up to $600, through [address]:  - Aspirin, Sitagliptan-Metformin (Janumet), Lisinopril-Hydrochlorothiazide, Simvastatin  - Generic ([facility] pharmacy-stocked) insulin syringes and pen needles  - Agamatrix or Prodigy meter and supplies    The new meter on the formulary that we discussed is called the DDStocks. This meter is Bluetooth capable and will sync with your phone once you have downloaded the required Aba. Once you have synced the Agamatrix Jazz and your phone your results will download to the Aba on your phone and you will be able to share them with your Provider. Please feel free to contact us if you change your mind and would like to receive this meter. We will work with your Provider and [facility] to have it sent out to you. [address] Delivery pharmacy: [phone], option 0 - please allow 2 weeks for processing and shipping prescriptions    Please work with your provider to ensure that the 2020 requirements are completed by the appropriate dates. Program Requirements to be completed in 2020:  1. Two office visits in 2020 for diabetes (1st must be completed by 7/1/2020)  2. Two A1c levels in 2020 (1st must be completed by 7/1/2020)  3. Yearly lipid panel  4. Yearly urine microalbumin test  5. Diabetes education if A1c is over 8%  6. Taking an ACE/ARB medication if appropriate  7. Taking a statin medication if appropriate  8. Pneumonia vaccine up to date  5.  Yearly flu shot (for 2186-3878 season) 10. Medication adherence over 70%. Patients who fall below 70% will be contacted by a pharmacist in the program to discuss any issues. If you have a provider outside of the [facility] system - a provider that does not use the [facility] electronic chart system - or if you have your testing - lab/urine - done at a facility other than [facility] you will have to email/fax this information to us so that we may document it into your [facility]. Thank you,  [address] Team  Telephone: [phone], Option #7   Fax: [phone]  Email: [email or web address]

## 2020-02-25 NOTE — TELEPHONE ENCOUNTER
[facility]) Employee Diabetes Program  =================================================================  James Munoz is a 62 y.o. male enrolled in the [address] [place] [address] with patient for yearly visit to discuss enrollment in program. Patient provided writer with verbal consent to remain in the program for this year. Program Requirements to be completed in 2020:  1. Two office visits in 2020 for diabetes (1st must be completed by 7/1/2020)  2. Two A1c levels in 2020 (1st must be completed by 7/1/2020)  3. Yearly lipid panel  4. Yearly urine microalbumin test  5. Diabetes education if A1c is over 8%  6. Taking an ACE/ARB medication if appropriate  7. Taking a statin medication if appropriate  8. Pneumonia vaccine up to date  5. Yearly flu shot (for 3767-7516 season)  10. Medication adherence over 70%. Patients who fall below 70% will be contacted by a pharmacist in the program to discuss any issues. Are you currently using the [address] (mail order) to get your prescriptions? Yes - except his Janumet - he is filling this medication at [facility] because he has a co-pay card and can only fill for a 30-day supply    Are you interested in changing your testing supplies to [employer]? No - patient not interested    Would you like to speak with a pharmacist about the program, your diabetes, and/or your prescriptions? No    [address] Team  Telephone [phone] Option #7   Fax [phone]  Email: [email or web address]    For Pharmacy Admin Tracking Only    PHSO: Yes  Recommended intervention potential cost savings: 1  Time Spent (min): 30

## 2020-03-10 RX ORDER — SIMVASTATIN 20 MG
TABLET ORAL
Qty: 90 TABLET | Refills: 1 | Status: SHIPPED | OUTPATIENT
Start: 2020-03-10 | End: 2020-09-21

## 2020-03-10 RX ORDER — ASPIRIN 81 MG/1
TABLET ORAL
Qty: 90 TABLET | Refills: 1 | Status: SHIPPED | OUTPATIENT
Start: 2020-03-10 | End: 2020-09-21

## 2020-04-13 RX ORDER — LISINOPRIL AND HYDROCHLOROTHIAZIDE 25; 20 MG/1; MG/1
1 TABLET ORAL DAILY
Qty: 90 TABLET | Refills: 1 | Status: SHIPPED | OUTPATIENT
Start: 2020-04-13 | End: 2020-10-19 | Stop reason: SDUPTHER

## 2020-05-05 RX ORDER — SITAGLIPTIN AND METFORMIN HYDROCHLORIDE 1000; 50 MG/1; MG/1
1 TABLET, FILM COATED ORAL 2 TIMES DAILY WITH MEALS
Qty: 180 TABLET | Refills: 1 | Status: SHIPPED | OUTPATIENT
Start: 2020-05-05 | End: 2020-12-01

## 2020-06-08 ENCOUNTER — TELEPHONE (OUTPATIENT)
Dept: PHARMACY | Facility: CLINIC | Age: 60
End: 2020-06-08

## 2020-06-08 NOTE — TELEPHONE ENCOUNTER
Patient called in advising that he was returning our call. In reviewing his EMR we did not out reach to him recently and the last encounter was from 5/05/20 for a refill of his Janumet. I advised this patient of the above information. Patient verified understanding and will follow-up with [facility] because this is where he fills this medication.

## 2020-06-16 RX ORDER — BLOOD SUGAR DIAGNOSTIC
STRIP MISCELLANEOUS
Qty: 100 EACH | Refills: 3 | Status: SHIPPED | OUTPATIENT
Start: 2020-06-16 | End: 2021-08-09

## 2020-06-16 RX ORDER — LANCETS 33 GAUGE
EACH MISCELLANEOUS
Qty: 100 EACH | Refills: 3 | Status: SHIPPED | OUTPATIENT
Start: 2020-06-16 | End: 2021-01-20

## 2020-06-16 NOTE — TELEPHONE ENCOUNTER
Last visit: 12-3-19  Last Med refill:5-31-19  Does patient have enough medication for 72 hours: No:     Next Visit Date:  Future Appointments   Date Time Provider Kyaw Cramer   6/22/2020  9:00 AM Kita [facility] Maintenance   Topic Date Due    Shingles Vaccine (1 of 2) 07/24/2008    Diabetic retinal exam  10/18/2018    PSA counseling  06/04/2020    Diabetic foot exam  12/03/2020    A1C test (Diabetic or Prediabetic)  12/03/2020    Diabetic microalbuminuria test  12/03/2020    Lipid screen  12/03/2020    Potassium monitoring  12/03/2020    Creatinine monitoring  12/03/2020    Colon cancer screen colonoscopy  01/10/2023    DTaP/Tdap/Td vaccine (2 - Td) 07/10/2023    Flu vaccine  Completed    Pneumococcal 0-64 years Vaccine  Completed    Hepatitis C screen  Completed    HIV screen  Completed    Hepatitis A vaccine  Aged Out    Hib vaccine  Aged Out    Meningococcal (ACWY) vaccine  Aged Out       Hemoglobin A1C (%)   Date Value   12/03/2019 6.6 (H)   06/04/2019 6.2 (H)   11/27/2018 6.4 (H)             ( goal A1C is < 7)   Microalb/Crt.  Ratio (mcg/mg creat)   Date Value   12/03/2019 CANNOT BE CALCULATED     LDL Cholesterol (mg/dL)   Date Value   12/03/2019 94   06/04/2019 80       (goal LDL is <100)   AST (U/L)   Date Value   12/03/2019 29     ALT (U/L)   Date Value   12/03/2019 46 (H)     BUN (mg/dL)   Date Value   12/03/2019 14     BP Readings from Last 3 Encounters:   12/03/19 128/82   06/04/19 98/64   05/28/19 115/82          (goal 120/80)    All Future Testing planned in [facility]  Lab Frequency Next Occurrence               Patient Active Problem List:     Hypertension     Hyperlipidemia     Elevated liver enzymes     BPH (benign prostatic hyperplasia)     Diabetic macular edema (HCC)     Diabetes mellitus type II, controlled (HCC)     Elevated PSA

## 2020-06-22 ENCOUNTER — HOSPITAL ENCOUNTER (OUTPATIENT)
Dept: GENERAL RADIOLOGY | Facility: CLINIC | Age: 60
Discharge: HOME | End: 2020-06-24
Payer: COMMERCIAL

## 2020-06-22 ENCOUNTER — HOSPITAL ENCOUNTER (OUTPATIENT)
Facility: CLINIC | Age: 60
Discharge: HOME | End: 2020-06-24
Payer: COMMERCIAL

## 2020-06-22 ENCOUNTER — OFFICE VISIT (OUTPATIENT)
Dept: FAMILY MEDICINE CLINIC | Age: 60
End: 2020-06-22
Payer: COMMERCIAL

## 2020-06-22 ENCOUNTER — HOSPITAL ENCOUNTER (OUTPATIENT)
Age: 60
Setting detail: SPECIMEN
Discharge: HOME | End: 2020-06-22
Payer: COMMERCIAL

## 2020-06-22 VITALS
RESPIRATION RATE: 18 BRPM | HEART RATE: 110 BPM | TEMPERATURE: 98.5 F | WEIGHT: 266 LBS | BODY MASS INDEX: 37.24 KG/M2 | DIASTOLIC BLOOD PRESSURE: 87 MMHG | SYSTOLIC BLOOD PRESSURE: 152 MMHG | OXYGEN SATURATION: 99 % | HEIGHT: 71 IN

## 2020-06-22 LAB
ALBUMIN SERPL-MCNC: 4.1 G/DL (ref 3.5–5.2)
ALBUMIN/GLOBULIN RATIO: 1.4 (ref 1–2.5)
ALP BLD-CCNC: 85 U/L (ref 40–129)
ALT SERPL-CCNC: 52 U/L (ref 5–41)
ANION GAP SERPL CALCULATED.3IONS-SCNC: 13 MMOL/L (ref 9–17)
AST SERPL-CCNC: 28 U/L
BILIRUB SERPL-MCNC: 0.53 MG/DL (ref 0.3–1.2)
BUN BLDV-MCNC: 15 MG/DL (ref 8–23)
BUN/CREAT BLD: ABNORMAL (ref 9–20)
CALCIUM SERPL-MCNC: 9.9 MG/DL (ref 8.6–10.4)
CHLORIDE BLD-SCNC: 100 MMOL/L (ref 98–107)
CHOLESTEROL/HDL RATIO: 3.6
CHOLESTEROL: 151 MG/DL
CO2: 27 MMOL/L (ref 20–31)
CREAT SERPL-MCNC: 0.74 MG/DL (ref 0.7–1.2)
CREATININE URINE: 108.3 MG/DL (ref 39–259)
ESTIMATED AVERAGE GLUCOSE: 154 MG/DL
GFR AFRICAN AMERICAN: >60 ML/MIN
GFR NON-AFRICAN AMERICAN: >60 ML/MIN
GFR SERPL CREATININE-BSD FRML MDRD: ABNORMAL ML/MIN/{1.73_M2}
GFR SERPL CREATININE-BSD FRML MDRD: ABNORMAL ML/MIN/{1.73_M2}
GLUCOSE BLD-MCNC: 120 MG/DL (ref 70–99)
HBA1C MFR BLD: 7 % (ref 4–6)
HCT VFR BLD CALC: 46.9 % (ref 40.7–50.3)
HDLC SERPL-MCNC: 42 MG/DL
HEMOGLOBIN: 15.3 G/DL (ref 13–17)
LDL CHOLESTEROL: 85 MG/DL (ref 0–130)
MCH RBC QN AUTO: 29.4 PG (ref 25.2–33.5)
MCHC RBC AUTO-ENTMCNC: 32.6 G/DL (ref 28.4–34.8)
MCV RBC AUTO: 90.2 FL (ref 82.6–102.9)
MICROALBUMIN/CREAT 24H UR: <12 MG/L
MICROALBUMIN/CREAT UR-RTO: NORMAL MCG/MG CREAT
NRBC AUTOMATED: 0 PER 100 WBC
PDW BLD-RTO: 12.7 % (ref 11.8–14.4)
PLATELET # BLD: 183 K/UL (ref 138–453)
PMV BLD AUTO: 10.4 FL (ref 8.1–13.5)
POTASSIUM SERPL-SCNC: 4.5 MMOL/L (ref 3.7–5.3)
PROSTATE SPECIFIC ANTIGEN: 6.61 UG/L
RBC # BLD: 5.2 M/UL (ref 4.21–5.77)
SODIUM BLD-SCNC: 140 MMOL/L (ref 135–144)
TOTAL PROTEIN: 7 G/DL (ref 6.4–8.3)
TRIGL SERPL-MCNC: 122 MG/DL
VLDLC SERPL CALC-MCNC: NORMAL MG/DL (ref 1–30)
WBC # BLD: 6.4 K/UL (ref 3.5–11.3)

## 2020-06-22 PROCEDURE — 73130 X-RAY EXAM OF HAND: CPT

## 2020-06-22 PROCEDURE — 99214 OFFICE O/P EST MOD 30 MIN: CPT | Performed by: NURSE PRACTITIONER

## 2020-06-22 SDOH — ECONOMIC STABILITY: FOOD INSECURITY: WITHIN THE PAST 12 MONTHS, YOU WORRIED THAT YOUR FOOD WOULD RUN OUT BEFORE YOU GOT MONEY TO BUY MORE.: NEVER TRUE

## 2020-06-22 SDOH — ECONOMIC STABILITY: INCOME INSECURITY: HOW HARD IS IT FOR YOU TO PAY FOR THE VERY BASICS LIKE FOOD, HOUSING, MEDICAL CARE, AND HEATING?: NOT HARD AT ALL

## 2020-06-22 SDOH — ECONOMIC STABILITY: FOOD INSECURITY: WITHIN THE PAST 12 MONTHS, THE FOOD YOU BOUGHT JUST DIDN'T LAST AND YOU DIDN'T HAVE MONEY TO GET MORE.: NEVER TRUE

## 2020-06-22 SDOH — ECONOMIC STABILITY: TRANSPORTATION INSECURITY
IN THE PAST 12 MONTHS, HAS LACK OF TRANSPORTATION KEPT YOU FROM MEETINGS, WORK, OR FROM GETTING THINGS NEEDED FOR DAILY LIVING?: NO

## 2020-06-22 SDOH — ECONOMIC STABILITY: TRANSPORTATION INSECURITY
IN THE PAST 12 MONTHS, HAS THE LACK OF TRANSPORTATION KEPT YOU FROM MEDICAL APPOINTMENTS OR FROM GETTING MEDICATIONS?: NO

## 2020-06-22 ASSESSMENT — ENCOUNTER SYMPTOMS
NAUSEA: 0
TROUBLE SWALLOWING: 0
SHORTNESS OF BREATH: 0
BACK PAIN: 0
DIARRHEA: 0
VOMITING: 0
WHEEZING: 0
SORE THROAT: 0
ABDOMINAL PAIN: 0
EYE PAIN: 0
CONSTIPATION: 0
COUGH: 0
EYE DISCHARGE: 0
RHINORRHEA: 0

## 2020-06-22 ASSESSMENT — PATIENT HEALTH QUESTIONNAIRE - PHQ9
SUM OF ALL RESPONSES TO PHQ QUESTIONS 1-9: 0
SUM OF ALL RESPONSES TO PHQ9 QUESTIONS 1 & 2: 0
2. FEELING DOWN, DEPRESSED OR HOPELESS: 0
SUM OF ALL RESPONSES TO PHQ QUESTIONS 1-9: 0
1. LITTLE INTEREST OR PLEASURE IN DOING THINGS: 0

## 2020-06-22 NOTE — PROGRESS NOTES
BLOOD GLUC) strip TEST ONCE A DAY AS NEEDED AS DIRECTED 100 each 3    sitaGLIPtan-metformin (JANUMET)  MG per tablet Take 1 tablet by mouth 2 times daily (with meals) 180 tablet 1    lisinopril-hydroCHLOROthiazide (PRINZIDE;ZESTORETIC) 20-25 MG per tablet Take 1 tablet by mouth daily 90 tablet 1    ASPIRIN ADULT LOW STRENGTH 81 MG EC tablet TAKE 1 TABLET BY MOUTH ONE TIME A DAY 90 tablet 1    simvastatin (ZOCOR) 20 MG tablet TAKE ONE TABLET BY MOUTH NIGHTLY 90 tablet 1    Blood Glucose Monitoring Suppl (PRODIGY AUTOCODE BLOOD GLUCOSE) w/Device KIT Use to test once daily and as needed as directed by provider 1 kit 0    PRODIGY LANCETS 28G MISC Use to test once daily and as needed as directed by provider 100 each 3    multivitamin (THERAGRAN) per tablet Take 1 tablet by mouth daily. No current facility-administered medications for this visit. Patient ID: Iam Smith is a 62 y.o. male. Patient presents in office today for routine follow up on chronic conditions. When he thinks he should be low his sugars are high. When he thinks his sugars are high they are lower. BS running higher in 160-170s. Checks his sugars every morning before work. Eating a lot of candy. Drinking flavored water or pop. Right thumb has been bothering him for about 1 month. Cannot bend his thumb. Thought could be arthritis but he can move his other fingers. No injury or trauma. No swelling. Sore at base of his thumb. Diabetes   He presents for his follow-up diabetic visit. He has type 2 diabetes mellitus. Pertinent negatives for hypoglycemia include no dizziness. Pertinent negatives for diabetes include no chest pain. Pertinent negatives for hypoglycemia complications include no blackouts and no hospitalization. Symptoms are stable. An ACE inhibitor/angiotensin II receptor blocker is being taken. Hyperlipidemia   This is a chronic problem. The current episode started more than 1 year ago.  Pertinent negatives include no chest pain or shortness of breath. Current antihyperlipidemic treatment includes statins. Compliance problems include adherence to diet and adherence to exercise. Hypertension   This is a chronic problem. The current episode started more than 1 year ago. The problem is controlled. Pertinent negatives include no chest pain or shortness of breath. Review of Systems   Constitutional: Negative for activity change, appetite change and fever. HENT: Negative for congestion, ear pain, rhinorrhea, sore throat and trouble swallowing. Eyes: Negative for pain and discharge. Has upcoming eye appt. Respiratory: Negative for cough, shortness of breath and wheezing. Cardiovascular: Negative for chest pain. Gastrointestinal: Negative for abdominal pain, constipation, diarrhea, nausea and vomiting. Genitourinary: Negative for dysuria. Musculoskeletal: Positive for arthralgias (fingers and right thumb). Negative for back pain, gait problem and joint swelling. Has been taking OTC Aleve or Tylenol Arthritis which helps with the pain. Skin: Negative for rash. Neurological: Negative for dizziness and light-headedness. Psychiatric/Behavioral: Positive for sleep disturbance (wakes up 1-2 times to urinate, can go back to sleep). Objective:     BP (!) 152/87 (Site: Right Upper Arm, Position: Sitting, Cuff Size: Medium Adult)   Pulse 110   Temp 98.5 °F (36.9 °C) (Temporal)   Resp 18   Ht 5' 11\" (1.803 m)   Wt 266 lb (120.7 kg)   SpO2 99%   BMI 37.10 kg/m²      Physical Exam  Vitals signs and nursing note reviewed. Constitutional:       General: He is not in acute distress. Appearance: He is well-developed. HENT:      Head: Normocephalic and atraumatic. Right Ear: Tympanic membrane and external ear normal.      Left Ear: Tympanic membrane and external ear normal.      Nose: Nose normal.   Eyes:      General:         Right eye: No discharge.

## 2020-09-21 RX ORDER — SIMVASTATIN 20 MG
20 TABLET ORAL NIGHTLY
Qty: 90 TABLET | Refills: 0 | Status: SHIPPED | OUTPATIENT
Start: 2020-09-21 | End: 2020-12-15 | Stop reason: SDUPTHER

## 2020-09-21 RX ORDER — ASPIRIN 81 MG/1
81 TABLET ORAL DAILY
Qty: 90 TABLET | Refills: 0 | Status: SHIPPED | OUTPATIENT
Start: 2020-09-21 | End: 2020-12-15 | Stop reason: SDUPTHER

## 2020-10-13 ENCOUNTER — TELEPHONE (OUTPATIENT)
Dept: UROLOGY | Age: 60
End: 2020-10-13

## 2020-10-19 RX ORDER — LISINOPRIL AND HYDROCHLOROTHIAZIDE 25; 20 MG/1; MG/1
1 TABLET ORAL DAILY
Qty: 90 TABLET | Refills: 1 | Status: SHIPPED | OUTPATIENT
Start: 2020-10-19 | End: 2021-04-19

## 2020-10-19 NOTE — TELEPHONE ENCOUNTER
LOV  6/22/20  LRF 4/13/20  RTO 6 months, Scheduled    Health Maintenance   Topic Date Due    Flu vaccine (1) 09/01/2020    Shingles Vaccine (1 of 2) 06/22/2021 (Originally 7/24/2008)    Diabetic retinal exam  07/02/2021 (Originally 10/18/2018)    Diabetic foot exam  12/03/2020    A1C test (Diabetic or Prediabetic)  06/22/2021    Diabetic microalbuminuria test  06/22/2021    Lipid screen  06/22/2021    Potassium monitoring  06/22/2021    Creatinine monitoring  06/22/2021    PSA counseling  06/22/2021    Colon cancer screen colonoscopy  01/10/2023    DTaP/Tdap/Td vaccine (2 - Td) 07/10/2023    Pneumococcal 0-64 years Vaccine  Completed    Hepatitis C screen  Completed    HIV screen  Completed    Hepatitis A vaccine  Aged Out    Hib vaccine  Aged Out    Meningococcal (ACWY) vaccine  Aged Out             (applicable per patient's age: Cancer Screenings, Depression Screening, Fall Risk Screening, Immunizations)    Hemoglobin A1C (%)   Date Value   06/22/2020 7.0 (H)   12/03/2019 6.6 (H)   06/04/2019 6.2 (H)     Microalb/Crt.  Ratio (mcg/mg creat)   Date Value   06/22/2020 CANNOT BE CALCULATED     LDL Cholesterol (mg/dL)   Date Value   06/22/2020 85     AST (U/L)   Date Value   06/22/2020 28     ALT (U/L)   Date Value   06/22/2020 52 (H)     BUN (mg/dL)   Date Value   06/22/2020 15      (goal A1C is < 7)   (goal LDL is <100) need 30-50% reduction from baseline     BP Readings from Last 3 Encounters:   06/22/20 (!) 152/87   12/03/19 128/82   06/04/19 98/64    (goal /80)      All Future Testing planned in [facility]:  Lab Frequency Next Occurrence       Next Visit Date:  Future Appointments   Date Time Provider Kyaw Cramer   11/3/2020 10:10 AM Kerri Rojas MD [id number]   12/15/2020  9:40 AM Lynne Zafar APRN - CNP [facility]            Patient Active Problem List:     Hypertension     Hyperlipidemia     Elevated liver enzymes     BPH (benign prostatic hyperplasia) Diabetic macular edema (HCC)     Diabetes mellitus type II, controlled ([facility] [address]     Elevated PSA

## 2020-11-03 ENCOUNTER — OFFICE VISIT (OUTPATIENT)
Dept: UROLOGY | Age: 60
End: 2020-11-03
Payer: COMMERCIAL

## 2020-11-03 VITALS
DIASTOLIC BLOOD PRESSURE: 86 MMHG | HEIGHT: 71 IN | WEIGHT: 266 LBS | SYSTOLIC BLOOD PRESSURE: 140 MMHG | BODY MASS INDEX: 37.24 KG/M2 | TEMPERATURE: 97 F

## 2020-11-03 PROCEDURE — 99213 OFFICE O/P EST LOW 20 MIN: CPT | Performed by: UROLOGY

## 2020-11-03 ASSESSMENT — ENCOUNTER SYMPTOMS
NAUSEA: 0
COUGH: 0
SHORTNESS OF BREATH: 0
CONSTIPATION: 0
ABDOMINAL PAIN: 0
BACK PAIN: 0
VOMITING: 0
DIARRHEA: 0
EYE PAIN: 0
WHEEZING: 0
EYE REDNESS: 0

## 2020-11-03 NOTE — LETTER
[address] [address]  Dept: [phone]  Dept Fax: [phone]        11/3/20    Patient: Negar Tang  YOB: 1960    Dear JAMES Latham CNP,    I had the pleasure of seeing one of your patients, Lino Nieto today in the office today. Below are the relevant portions of my assessment and plan of care. IMPRESSION:  1. Elevated PSA    2. Benign prostatic hyperplasia with nocturia        PLAN:  He has a known elevated PSA. He has had 2 negative biopsies and a negative MRI. He has a known large prostate, but is voiding well with AUA SS of 3. F/U in 1 year with a PSA. Return in about 1 year (around 11/3/2021). Prescriptions Ordered:  No orders of the defined types were placed in this encounter. Orders Placed:  Orders Placed This Encounter   Procedures    PSA, Diagnostic     Standing Status:   Future     Standing Expiration Date:   11/3/2021        Thank you for allowing me to participate in the care of this patient. I will keep you updated on this patient's follow up and I look forward to serving you and your patients again in the future.         Loraine Lopes MD

## 2020-11-03 NOTE — PROGRESS NOTES
[address] [address]  Dept:  Katy Jimenez [facility] Urology Office Note - Established    Patient:  Ovidio Quiñones  YOB: 1960  Date: 11/3/2020    The patient is a 56 y.o. male who presents todayfor evaluation of the following problems:   Chief Complaint   Patient presents with    Elevated PSA     yearly f/u        HPI  He is here in follow up for elevated PSA and BPH. He has minimal voiding complaints. He does wake up at night, but he does drink a lot of water before bed. He is not interested in any meds at this time. His MRI was PI RADS 2, and his volume was 96ml. He has had 2 negative biopsies. Summary of old records: N/A    Additional History: N/A    Procedures Today: N/A    Urinalysis today:  No results found for this visit on 11/03/20.   Last several PSA's:  Lab Results   Component Value Date    PSA 6.61 (H) 06/22/2020    PSA 6.71 (H) 06/04/2019    PSA 6.79 (H) 11/16/2018     Last total testosterone:  No results found for: TESTOSTERONE    AUA Symptom Score (11/3/2020):  INCOMPLETE EMPTYING: How often have you had the sensation of not emptying your bladder?: Not at all  FREQUENCY: How often do you have to urinate less than every two hours?: Not at all  INTERMITTENCY: How often have you found you stopped and started again several times when you urinated?: Less than 1 to 5 times  URGENCY: How often have you found it difficult to postpone urination?: Not at all  WEAK STREAM: How often have you had a weak urinary stream?: Not at all  STRAINING: How often have you had to strain to start  urination?: Not at all  NOCTURIA: How many times did you typically get up at night to uriniate?: 2 Times  TOTAL I-PSS SCORE[de-identified] 3  How would you feel if you were to spend the rest of your life with your urinary condition?: Mostly Satisfied    Last BUN and creatinine:  Lab Results   Component Value Date    BUN 15 2020     Lab Results   Component Value Date    CREATININE 0.74 2020       Additional Lab/Culture results: none    Imaging Reviewed during this Office Visit: none  (results were independently reviewed by physician and radiology report verified)    PAST MEDICAL, FAMILY AND SOCIAL HISTORY UPDATE:  Past Medical History:   Diagnosis Date    Allergic rhinitis     BPH (benign prostatic hyperplasia)     Diabetes mellitus ([facility] [address]     Hyperlipidemia     Hypertension     Type II or unspecified type diabetes mellitus without mention of complication, not stated as uncontrolled      Past Surgical History:   Procedure Laterality Date    COLONOSCOPY  2013    polypectomy bx adenomatous polyp    PROSTATE BIOPSY  Dec 2012, aug 2013    normal     Family History   Problem Relation Age of Onset    Heart Disease Mother         CAD s/p stents    Heart Disease Father         CAD,  at 72 of MI    High Blood Pressure Father      Outpatient Medications Marked as Taking for the 11/3/20 encounter (Office Visit) with Willie Major MD   Medication Sig Dispense Refill    lisinopril-hydroCHLOROthiazide (PRINZIDE;ZESTORETIC) 20-25 MG per tablet Take 1 tablet by mouth daily 90 tablet 1    aspirin (ASPIRIN ADULT LOW STRENGTH) 81 MG EC tablet Take 1 tablet by mouth daily 90 tablet 0    simvastatin (ZOCOR) 20 MG tablet Take 1 tablet by mouth nightly 90 tablet 0    AgaMatrix Ultra-Thin Lancets MISC USE TO TEST ONCE DAILY 100 each 3    blood glucose test strips (PRODIGY NO CODING BLOOD GLUC) strip TEST ONCE A DAY AS NEEDED AS DIRECTED 100 each 3    sitaGLIPtan-metformin (JANUMET)  MG per tablet Take 1 tablet by mouth 2 times daily (with meals) 180 tablet 1    Blood Glucose Monitoring Suppl (PRODIGY AUTOCODE BLOOD GLUCOSE) w/Device KIT Use to test once daily and as needed as directed by provider 1 kit 0    PRODIGY LANCETS 28G MISC Use to test once daily and as needed as directed by provider 100 each 3    MD    Agree with the ROS entered by the MA.

## 2020-12-01 RX ORDER — SITAGLIPTIN AND METFORMIN HYDROCHLORIDE 1000; 50 MG/1; MG/1
TABLET, FILM COATED ORAL
Qty: 180 TABLET | Refills: 1 | Status: SHIPPED | OUTPATIENT
Start: 2020-12-01 | End: 2021-06-08

## 2020-12-01 NOTE — TELEPHONE ENCOUNTER
LOV 6/22/20  LRF 5/5/20  RTO Scheduled    Health Maintenance   Topic Date Due    Diabetic foot exam  12/03/2020    Shingles Vaccine (1 of 2) 06/22/2021 (Originally 7/24/2008)    Diabetic retinal exam  07/02/2021 (Originally 10/18/2018)    A1C test (Diabetic or Prediabetic)  06/22/2021    Diabetic microalbuminuria test  06/22/2021    Lipid screen  06/22/2021    Potassium monitoring  06/22/2021    Creatinine monitoring  06/22/2021    PSA counseling  06/22/2021    Colon cancer screen colonoscopy  01/10/2023    DTaP/Tdap/Td vaccine (2 - Td) 07/10/2023    Flu vaccine  Completed    Hepatitis C screen  Completed    HIV screen  Completed    Hepatitis A vaccine  Aged Out    Hib vaccine  Aged Out    Meningococcal (ACWY) vaccine  Aged Out    Pneumococcal 0-64 years Vaccine  Aged Out             (applicable per patient's age: Cancer Screenings, Depression Screening, Fall Risk Screening, Immunizations)    Hemoglobin A1C (%)   Date Value   06/22/2020 7.0 (H)   12/03/2019 6.6 (H)   06/04/2019 6.2 (H)     Microalb/Crt.  Ratio (mcg/mg creat)   Date Value   06/22/2020 CANNOT BE CALCULATED     LDL Cholesterol (mg/dL)   Date Value   06/22/2020 85     AST (U/L)   Date Value   06/22/2020 28     ALT (U/L)   Date Value   06/22/2020 52 (H)     BUN (mg/dL)   Date Value   06/22/2020 15      (goal A1C is < 7)   (goal LDL is <100) need 30-50% reduction from baseline     BP Readings from Last 3 Encounters:   11/03/20 (!) 140/86   06/22/20 (!) 152/87   12/03/19 128/82    (goal /80)      All Future Testing planned in [facility]:  Lab Frequency Next Occurrence   PSA, Diagnostic Once 11/03/2021       Next Visit Date:  Future Appointments   Date Time Provider Kyaw Cramer   12/15/2020  9:40 AM JAMES Tong - [facility]            Patient Active Problem List:     Hypertension     Hyperlipidemia     Elevated liver enzymes     BPH (benign prostatic hyperplasia)     Diabetic macular edema ([address]     Diabetes mellitus type II, controlled ([facility] [address]     Elevated PSA

## 2020-12-15 ENCOUNTER — OFFICE VISIT (OUTPATIENT)
Dept: FAMILY MEDICINE CLINIC | Age: 60
End: 2020-12-15
Payer: COMMERCIAL

## 2020-12-15 ENCOUNTER — HOSPITAL ENCOUNTER (OUTPATIENT)
Age: 60
Setting detail: SPECIMEN
Discharge: HOME | End: 2020-12-15
Payer: COMMERCIAL

## 2020-12-15 VITALS
SYSTOLIC BLOOD PRESSURE: 117 MMHG | WEIGHT: 267 LBS | DIASTOLIC BLOOD PRESSURE: 84 MMHG | OXYGEN SATURATION: 97 % | TEMPERATURE: 96.3 F | HEART RATE: 70 BPM | BODY MASS INDEX: 37.38 KG/M2 | HEIGHT: 71 IN

## 2020-12-15 PROBLEM — K21.9 GASTROESOPHAGEAL REFLUX DISEASE: Status: ACTIVE | Noted: 2020-12-15

## 2020-12-15 LAB
ALBUMIN SERPL-MCNC: 4.1 G/DL (ref 3.5–5.2)
ALBUMIN/GLOBULIN RATIO: 1.4 (ref 1–2.5)
ALP BLD-CCNC: 83 U/L (ref 40–129)
ALT SERPL-CCNC: 61 U/L (ref 5–41)
ANION GAP SERPL CALCULATED.3IONS-SCNC: 11 MMOL/L (ref 9–17)
AST SERPL-CCNC: 38 U/L
BILIRUB SERPL-MCNC: 0.68 MG/DL (ref 0.3–1.2)
BUN BLDV-MCNC: 21 MG/DL (ref 8–23)
BUN/CREAT BLD: ABNORMAL (ref 9–20)
CALCIUM SERPL-MCNC: 9.6 MG/DL (ref 8.6–10.4)
CHLORIDE BLD-SCNC: 102 MMOL/L (ref 98–107)
CHOLESTEROL/HDL RATIO: 2.8
CHOLESTEROL: 130 MG/DL
CO2: 25 MMOL/L (ref 20–31)
CREAT SERPL-MCNC: 0.77 MG/DL (ref 0.7–1.2)
GFR AFRICAN AMERICAN: >60 ML/MIN
GFR NON-AFRICAN AMERICAN: >60 ML/MIN
GFR SERPL CREATININE-BSD FRML MDRD: ABNORMAL ML/MIN/{1.73_M2}
GFR SERPL CREATININE-BSD FRML MDRD: ABNORMAL ML/MIN/{1.73_M2}
GLUCOSE BLD-MCNC: 109 MG/DL (ref 70–99)
HDLC SERPL-MCNC: 47 MG/DL
LDL CHOLESTEROL: 65 MG/DL (ref 0–130)
POTASSIUM SERPL-SCNC: 4.3 MMOL/L (ref 3.7–5.3)
SODIUM BLD-SCNC: 138 MMOL/L (ref 135–144)
TOTAL PROTEIN: 7.1 G/DL (ref 6.4–8.3)
TRIGL SERPL-MCNC: 89 MG/DL
VLDLC SERPL CALC-MCNC: NORMAL MG/DL (ref 1–30)

## 2020-12-15 PROCEDURE — 99214 OFFICE O/P EST MOD 30 MIN: CPT | Performed by: NURSE PRACTITIONER

## 2020-12-15 PROCEDURE — 3051F HG A1C>EQUAL 7.0%<8.0%: CPT | Performed by: NURSE PRACTITIONER

## 2020-12-15 RX ORDER — ASPIRIN 81 MG/1
81 TABLET ORAL DAILY
Qty: 90 TABLET | Refills: 1 | Status: SHIPPED | OUTPATIENT
Start: 2020-12-15 | End: 2021-08-09

## 2020-12-15 RX ORDER — SIMVASTATIN 20 MG
20 TABLET ORAL NIGHTLY
Qty: 90 TABLET | Refills: 1 | Status: SHIPPED | OUTPATIENT
Start: 2020-12-15 | End: 2021-10-21

## 2020-12-15 ASSESSMENT — ENCOUNTER SYMPTOMS
EYE DISCHARGE: 0
BACK PAIN: 0
DIARRHEA: 0
RHINORRHEA: 0
TROUBLE SWALLOWING: 0
COUGH: 0
VOMITING: 0
SORE THROAT: 0
SHORTNESS OF BREATH: 0
WHEEZING: 0
NAUSEA: 0
EYE PAIN: 0
ABDOMINAL PAIN: 0
CONSTIPATION: 0

## 2020-12-15 NOTE — PROGRESS NOTES
Subjective:      Visit Information    Have you changed or started any medications since your last visit including any over-the-counter medicines, vitamins, or herbal medicines? no   Are you having any side effects from any of your medications? -  no  Have you stopped taking any of your medications? Is so, why? -  no    Have you seen any other physician or provider since your last visit? [facility], yearly prostate exam  Have you had any other diagnostic tests since your last visit? No  Have you been seen in the emergency room and/or had an admission to a hospital since we last saw you? No  Have you had your routine dental cleaning in the past 6 months? no    Have you activated your [employer] account? If not, what are your barriers?  Yes     Patient Care Team:  JAMES Padilla CNP as PCP - General (Family Medicine)  JAMES Padilla CNP as PCP - [facility] Provider    Medical History Review  Past Medical, Family, and Social History reviewed and does not contribute to the patient presenting condition    Health Maintenance   Topic Date Due    Diabetic foot exam  12/03/2020    Shingles Vaccine (1 of 2) 06/22/2021 (Originally 7/24/2008)    Diabetic retinal exam  07/02/2021 (Originally 10/18/2018)    A1C test (Diabetic or Prediabetic)  06/22/2021    Diabetic microalbuminuria test  06/22/2021    Lipid screen  06/22/2021    Potassium monitoring  06/22/2021    Creatinine monitoring  06/22/2021    PSA counseling  06/22/2021    Colon cancer screen colonoscopy  01/10/2023    DTaP/Tdap/Td vaccine (2 - Td) 07/10/2023    Flu vaccine  Completed    Pneumococcal 0-64 years Vaccine  Completed    Hepatitis C screen  Completed    HIV screen  Completed    Hepatitis A vaccine  Aged Out    Hib vaccine  Aged Out    Meningococcal (ACWY) vaccine  Aged Out       Current Outpatient Medications   Medication Sig Dispense Refill    aspirin (ASPIRIN ADULT LOW STRENGTH) 81 MG EC tablet Take 1 tablet by mouth daily 90 tablet 1    simvastatin (ZOCOR) 20 MG tablet Take 1 tablet by mouth nightly 90 tablet 1    JANUMET  MG per tablet TAKE 1 TABLET BY MOUTH 2 TIMES A DAY WITH MEALS 180 tablet 1    lisinopril-hydroCHLOROthiazide (PRINZIDE;ZESTORETIC) 20-25 MG per tablet Take 1 tablet by mouth daily 90 tablet 1    AgaMatrix Ultra-Thin Lancets MISC USE TO TEST ONCE DAILY 100 each 3    blood glucose test strips (PRODIGY NO CODING BLOOD GLUC) strip TEST ONCE A DAY AS NEEDED AS DIRECTED 100 each 3    Blood Glucose Monitoring Suppl (PRODIGY AUTOCODE BLOOD GLUCOSE) w/Device KIT Use to test once daily and as needed as directed by provider 1 kit 0    PRODIGY LANCETS 28G MISC Use to test once daily and as needed as directed by provider 100 each 3    multivitamin (THERAGRAN) per tablet Take 1 tablet by mouth daily. No current facility-administered medications for this visit. Patient ID: Zaheer Velasquez is a 56 y.o. male. Presents in office today for routine follow-up on chronic conditions. Blood sugars are high. Trying to check every morning. BS running 160-200. Went about 1 week without Janumet [facility]. There was a pharmacy mix up. He is back on it. Lots of arthritis in his hands. Tried CBD cream on his thumb and felt better in couple weeks. Only using every once and awhile. Left knee has been acting up too. No injury or trauma. No popping. Pain worse going up and down steps. Again using CBD cream which helps. Gets acid reflux from tomato based products- tacos, chili. Will use Tums which helps. Doesn't get that often. Had follow up with Dr. Star Georges last month for elevated PSA. Had negative biopsy and MRI. Told to follow up in 1 year. Last saw Dr. Vita Centeno about 1 year ago. Due for foot exam.   Hypertension  This is a chronic problem. The problem is controlled. Pertinent negatives include no chest pain or shortness of breath.  Risk factors for coronary artery disease include diabetes mellitus, Continue daily statin. - aspirin (ASPIRIN ADULT LOW STRENGTH) 81 MG EC tablet; Take 1 tablet by mouth daily  Dispense: 90 tablet; Refill: 1  - simvastatin (ZOCOR) 20 MG tablet; Take 1 tablet by mouth nightly  Dispense: 90 tablet; Refill: 1  - Lipid Panel; Future    3. Essential hypertension    Stable. No medication changes. 4. Elevated liver enzymes    Will check liver function panel. 5. Gastroesophageal reflux disease, unspecified whether esophagitis present    Mostly with eating red sauces. Uses OTC Tums as needed which helps. 6. Osteoarthritis of multiple joints, unspecified osteoarthritis type    Uses CBD cream on hands and knee which helps the pain. Due for diabetic foot exam. Has not seen Dr. Vita Centeno in over 1 year but will call to schedule. Call office with concerns            Letitia Serrano received counseling on the following healthy behaviors: nutrition, exercise and medication adherence  Reviewed prior labs and health maintenance. Continue current medications, diet and exercise. Discussed use, benefit, and side effects of prescribed medications. Barriers to medication compliance addressed. Patient given educational materials - see patient instructions. All patient questions answered. Patient voiced understanding.            Electronically signed by JAMES Penn CNP on 12/15/2020 at 11:04 AM

## 2020-12-16 LAB
ESTIMATED AVERAGE GLUCOSE: 160 MG/DL
HBA1C MFR BLD: 7.2 % (ref 4–6)

## 2021-01-07 ENCOUNTER — TELEPHONE (OUTPATIENT)
Dept: PHARMACY | Facility: CLINIC | Age: 61
End: 2021-01-07

## 2021-01-07 NOTE — TELEPHONE ENCOUNTER
Called patient to schedule pharmacist appointment to discuss medications for Diabetes Management Program.    No answer. Left VM on home/cell TAD: Please call back at [phone] Option #7 to retrieve the above message.

## 2021-01-13 NOTE — TELEPHONE ENCOUNTER
Called patient to schedule pharmacist appointment to discuss medications for Diabetes Management Program.    Appt Scheduled 1/20/2021 @[id number]

## 2021-01-20 ENCOUNTER — SCHEDULED TELEPHONE ENCOUNTER (OUTPATIENT)
Dept: PHARMACY | Facility: CLINIC | Age: 61
End: 2021-01-20

## 2021-01-20 NOTE — TELEPHONE ENCOUNTER
POPULATION HEALTH CLINICAL PHARMACY REVIEW - BE WELL WITH DIABETES  ====================================================  Jabari Rivera is a 56 y.o. male enrolled in the [address] [place], [place] [address] Employee Diabetes Program. Patient provided Regina Hernández with verbal consent to remain in the program for this year. Medications:  Medication Sig Notes    aspirin (ASPIRIN ADULT LOW STRENGTH) 81 MG EC tablet Take 1 tablet by mouth daily     simvastatin (ZOCOR) 20 MG tablet Take 1 tablet by mouth nightly     JANUMET  MG per tablet TAKE 1 TABLET BY MOUTH 2 TIMES A DAY WITH MEALS     lisinopril-hydroCHLOROthiazide (PRINZIDE;ZESTORETIC) 20-25 MG per tablet Take 1 tablet by mouth daily     AgaMatrix Ultra-Thin Lancets MISC USE TO TEST ONCE DAILY Uses Prodigy brand    blood glucose test strips (PRODIGY NO CODING BLOOD GLUC) strip TEST ONCE A DAY AS NEEDED AS DIRECTED     Blood Glucose Monitoring Suppl (PRODIGY AUTOCODE BLOOD GLUCOSE) w/Device KIT Use to test once daily and as needed as directed by provider     PRODIGY LANCETS 28G MISC Use to test once daily and as needed as directed by provider     multivitamin [facility]) per tablet Take 1 tablet by mouth daily. Current Pharmacy: [facility]  Current testing supplies/frequency: Prodigy; tests every morning  Pen needles/syringes: n/a    Allergies:   Allergies   Allergen Reactions    Chocolate Other (See Comments)     headaches      Vitals/Labs:  BP Readings from Last 3 Encounters:   12/15/20 117/84   11/03/20 (!) 140/86   06/22/20 (!) 152/87     Lab Results   Component Value Date    LABMICR CANNOT BE CALCULATED 06/22/2020     Lab Results   Component Value Date    LABA1C 7.2 (H) 12/15/2020    LABA1C 7.0 (H) 06/22/2020    LABA1C 6.6 (H) 12/03/2019     Lab Results   Component Value Date    CHOL 130 12/15/2020    TRIG 89 12/15/2020    HDL 47 12/15/2020    LDLCHOLESTEROL 65 12/15/2020     ALT   Date Value Ref Range Status   12/15/2020 61 (H) 5 - 41 U/L Final     AST   Date Value Ref Range Status   12/15/2020 38 <40 U/L Final     The 10-year ASCVD risk score (Yovana He., et al., 2013) is: 13.8%    Values used to calculate the score:      Age: 56 years      Sex: Male      Is Non- : No      Diabetic: Yes      Tobacco smoker: No      Systolic Blood Pressure: 383 mmHg      Is BP treated: Yes      HDL Cholesterol: 47 mg/dL      Total Cholesterol: 130 mg/dL     Lab Results   Component Value Date    CREATININE 0.77 12/15/2020     CrCl cannot be calculated (Unknown ideal weight. ). eGFR: >60 mL/min/1.73 m^2    Immunizations:  Immunization History   Administered Date(s) Administered    Influenza Virus Vaccine 10/17/2017, 10/17/2017, 10/26/2018, 2018, 10/25/2019, 10/02/2020    Pneumococcal Polysaccharide ([id number]) 2016    Tdap (Boostrix, Adacel) 07/10/2013      Social History:  Social History     Tobacco Use    Smoking status: Former Smoker     Packs/day: 1.00     Years: 20.00     Pack years: 20.00     Quit date: 2002     Years since quittin.3    Smokeless tobacco: Never Used    Tobacco comment: single, works at [employer] [address] in [place]   Substance Use Topics    Alcohol use: No     Alcohol/week: 0.0 standard drinks     Comment: used to be heavy drinker, quit 20 yrs ago     ASSESSMENT:  Initial Program Requirements (Y indicates has completed for the year, N indicates needs to be completed by 2021): No - Office Visit with provider for DM (1st)  No - A1c (1st)     Ongoing Program Requirements (Y indicates has completed for the year, N indicates needs to be completed by 2021):   No - Office Visit with provider for DM (2nd)  n/a - ACC/diabetes educator visit (if A1c over 8%)  No - A1c (2nd)  No - Lipid panel  No - Urine microalbumin  Yes - Pneumococcal vaccination: Up-to-date, not needed again until age 70  No - Influenza vaccination for 2021  n/a - Medication adherence over 70%  Yes - On statin or contraindication(s) simvastatin 20 mg tablets  Yes - On ACEi/ARB or contraindication(s) lisinopril-hydrochlorothiazide 20-25 mg tablets     Formulary Medication Review:  Non-formulary or medications with cost-effective alternatives: n/a. Current medications eligible for copay waiver, up to $600, through [address]:  - aspirin (with prescription), simvastatin, Janumet, lisinopril-hydrochlorothiazide  - Prodigy     Diabetes Care:   Type 2 DM under acceptable control as evidenced by 7.2% A1c.  - Glycemic Goal: <7.0%. Is almost at goal, last A1c 7.2% December 2020  - Adherent to ACEi/ARB and/or statin: He states he received 2 Simvastatin and aspirin 90 day supplies early January. Can see claims from 12/29/20 and 12/15/20. He will not need refilled for 6 months from then. - Eye exam current (within one year): states he is due for an appointment and plans to schedule  - Foot exam current (within one year): yes  - Daily aspirin? Yes  - Diet compliance: Has been cutting down on diet pop and drinking more water. Likes to eat a fresh fruit. Discussed trying to incorporate more non starchy vegetables, lean protein. - Current exercise: walking twice weekly for about 15-20 minutes; encouraged to increase this to 30 minutes 5 days per week as able. Other Considerations:  - Blood Pressure Goal: BP less than 140/90 mmHg due to history of DM: Is at blood pressure goal.   - Lipids: Patient is prescribed moderate-intensity statin therapy.   - Smoking status: former smoker    PLAN:  - DM program gaps identified:   · Initial requirements: Office Visit with provider for DM (1st) and A1c (1st)   · Ongoing requirements: Office visit with provider for DM (2nd), ACC/diabetes educator visit (if A1c over 8%), A1c (2nd), Lipid panel, Urine microalbumin, Influenza vaccination for [id number] and Medication adherence over 70%   - Education to patient: Addressed diet and exercise, Discussed foot

## 2021-04-19 DIAGNOSIS — I10 ESSENTIAL HYPERTENSION: ICD-10-CM

## 2021-04-20 RX ORDER — LISINOPRIL AND HYDROCHLOROTHIAZIDE 25; 20 MG/1; MG/1
1 TABLET ORAL DAILY
Qty: 90 TABLET | Refills: 1 | Status: SHIPPED | OUTPATIENT
Start: 2021-04-20 | End: 2021-10-21

## 2021-06-08 DIAGNOSIS — E11.9 CONTROLLED TYPE 2 DIABETES MELLITUS WITHOUT COMPLICATION, WITHOUT LONG-TERM CURRENT USE OF INSULIN (HCC): ICD-10-CM

## 2021-06-08 RX ORDER — SITAGLIPTIN AND METFORMIN HYDROCHLORIDE 1000; 50 MG/1; MG/1
1 TABLET, FILM COATED ORAL 2 TIMES DAILY WITH MEALS
Qty: 60 TABLET | Refills: 0 | Status: SHIPPED | OUTPATIENT
Start: 2021-06-08 | End: 2021-07-14

## 2021-06-08 NOTE — TELEPHONE ENCOUNTER
Last visit: 12/15/2020  Last Med refill: 12/01/2020  Does patient have enough medication for 72 hours: Yes    Next Visit Date:  Future Appointments   Date Time Provider Kyaw Cramer   6/22/2021  9:30 AM JAMES Rey NP [address] Maintenance   Topic Date Due    COVID-19 Vaccine (1) Never done    Diabetic foot exam  12/03/2020    Diabetic microalbuminuria test  06/22/2021    PSA counseling  06/22/2021    Shingles Vaccine (1 of 2) 06/22/2021 (Originally 7/24/2008)    Diabetic retinal exam  07/02/2021 (Originally 10/18/2018)    A1C test (Diabetic or Prediabetic)  12/15/2021    Lipid screen  12/15/2021    Potassium monitoring  12/15/2021    Creatinine monitoring  12/15/2021    Colon cancer screen colonoscopy  01/10/2023    DTaP/Tdap/Td vaccine (2 - Td or Tdap) 07/10/2023    Pneumococcal 0-64 years Vaccine (2 of 2) 07/24/2023    Flu vaccine  Completed    Hepatitis C screen  Completed    HIV screen  Completed    Hepatitis A vaccine  Aged Out    Hib vaccine  Aged Out    Meningococcal (ACWY) vaccine  Aged Out       Hemoglobin A1C (%)   Date Value   12/15/2020 7.2 (H)   06/22/2020 7.0 (H)   12/03/2019 6.6 (H)             ( goal A1C is < 7)   Microalb/Crt.  Ratio (mcg/mg creat)   Date Value   06/22/2020 CANNOT BE CALCULATED     LDL Cholesterol (mg/dL)   Date Value   12/15/2020 65   06/22/2020 85       (goal LDL is <100)   AST (U/L)   Date Value   12/15/2020 38     ALT (U/L)   Date Value   12/15/2020 61 (H)     BUN (mg/dL)   Date Value   12/15/2020 21     BP Readings from Last 3 Encounters:   12/15/20 117/84   11/03/20 (!) 140/86   06/22/20 (!) 152/87          (goal 120/80)    All Future Testing planned in [facility]  Lab Frequency Next Occurrence   PSA, Diagnostic Once 11/03/2021               Patient Active Problem List:     Hypertension     Hyperlipidemia     Elevated liver enzymes     BPH (benign prostatic hyperplasia)     Diabetic macular edema (HCC)     Diabetes mellitus type II, controlled ([address]     Elevated PSA     Gastroesophageal reflux disease

## 2021-06-11 ENCOUNTER — TELEPHONE (OUTPATIENT)
Dept: PHARMACY | Facility: CLINIC | Age: 61
End: 2021-06-11

## 2021-06-11 NOTE — TELEPHONE ENCOUNTER
Pharmacy Pop Care Documentation:   Called patient with reminder for requirements for Diabetes Management Program.     According to our records, patient is missing the following requirement(s) that must be completed by July 1st 2021:   · 1st 2021 Provider Visit for DM  · 1st 2021 A1C     Patient not available at the time of call. Left message on mobile TAD with the above information. miDrivet message sent.       Gabriela Cordon, [address]   Department, toll free: [phone], option 7

## 2021-06-22 ENCOUNTER — OFFICE VISIT (OUTPATIENT)
Dept: FAMILY MEDICINE CLINIC | Age: 61
End: 2021-06-22
Payer: COMMERCIAL

## 2021-06-22 ENCOUNTER — HOSPITAL ENCOUNTER (OUTPATIENT)
Age: 61
Setting detail: SPECIMEN
Discharge: HOME | End: 2021-06-22
Payer: COMMERCIAL

## 2021-06-22 VITALS
RESPIRATION RATE: 14 BRPM | OXYGEN SATURATION: 98 % | TEMPERATURE: 96.6 F | SYSTOLIC BLOOD PRESSURE: 154 MMHG | WEIGHT: 266 LBS | HEART RATE: 75 BPM | HEIGHT: 71 IN | BODY MASS INDEX: 37.24 KG/M2 | DIASTOLIC BLOOD PRESSURE: 97 MMHG

## 2021-06-22 DIAGNOSIS — R97.20 ELEVATED PSA: ICD-10-CM

## 2021-06-22 DIAGNOSIS — E78.00 PURE HYPERCHOLESTEROLEMIA: ICD-10-CM

## 2021-06-22 DIAGNOSIS — B35.1 FUNGAL INFECTION OF NAIL: ICD-10-CM

## 2021-06-22 DIAGNOSIS — E11.9 CONTROLLED TYPE 2 DIABETES MELLITUS WITHOUT COMPLICATION, WITHOUT LONG-TERM CURRENT USE OF INSULIN (HCC): Primary | ICD-10-CM

## 2021-06-22 DIAGNOSIS — I10 ESSENTIAL HYPERTENSION: ICD-10-CM

## 2021-06-22 DIAGNOSIS — E11.9 CONTROLLED TYPE 2 DIABETES MELLITUS WITHOUT COMPLICATION, WITHOUT LONG-TERM CURRENT USE OF INSULIN (HCC): ICD-10-CM

## 2021-06-22 DIAGNOSIS — K21.9 GASTROESOPHAGEAL REFLUX DISEASE, UNSPECIFIED WHETHER ESOPHAGITIS PRESENT: ICD-10-CM

## 2021-06-22 LAB
CREATININE URINE: 111.4 MG/DL (ref 39–259)
ESTIMATED AVERAGE GLUCOSE: 177 MG/DL
HBA1C MFR BLD: 7.8 % (ref 4–6)
MICROALBUMIN/CREAT 24H UR: <12 MG/L
MICROALBUMIN/CREAT UR-RTO: NORMAL MCG/MG CREAT

## 2021-06-22 PROCEDURE — 3051F HG A1C>EQUAL 7.0%<8.0%: CPT | Performed by: NURSE PRACTITIONER

## 2021-06-22 PROCEDURE — 2022F DILAT RTA XM EVC RTNOPTHY: CPT | Performed by: NURSE PRACTITIONER

## 2021-06-22 PROCEDURE — 99214 OFFICE O/P EST MOD 30 MIN: CPT | Performed by: NURSE PRACTITIONER

## 2021-06-22 PROCEDURE — G8427 DOCREV CUR MEDS BY ELIG CLIN: HCPCS | Performed by: NURSE PRACTITIONER

## 2021-06-22 PROCEDURE — 1036F TOBACCO NON-USER: CPT | Performed by: NURSE PRACTITIONER

## 2021-06-22 PROCEDURE — 3017F COLORECTAL CA SCREEN DOC REV: CPT | Performed by: NURSE PRACTITIONER

## 2021-06-22 PROCEDURE — G8417 CALC BMI ABV UP PARAM F/U: HCPCS | Performed by: NURSE PRACTITIONER

## 2021-06-22 PROCEDURE — 2028F FOOT EXAM PERFORMED: CPT | Performed by: NURSE PRACTITIONER

## 2021-06-22 RX ORDER — OMEPRAZOLE 20 MG/1
20 CAPSULE, DELAYED RELEASE ORAL
Qty: 30 CAPSULE | Refills: 1 | Status: SHIPPED | OUTPATIENT
Start: 2021-06-22 | End: 2022-01-21 | Stop reason: DRUGHIGH

## 2021-06-22 ASSESSMENT — PATIENT HEALTH QUESTIONNAIRE - PHQ9
2. FEELING DOWN, DEPRESSED OR HOPELESS: 0
SUM OF ALL RESPONSES TO PHQ QUESTIONS 1-9: 0
SUM OF ALL RESPONSES TO PHQ QUESTIONS 1-9: 0
SUM OF ALL RESPONSES TO PHQ9 QUESTIONS 1 & 2: 0
SUM OF ALL RESPONSES TO PHQ QUESTIONS 1-9: 0
1. LITTLE INTEREST OR PLEASURE IN DOING THINGS: 0

## 2021-06-22 ASSESSMENT — ENCOUNTER SYMPTOMS
DIARRHEA: 0
TROUBLE SWALLOWING: 0
EYE DISCHARGE: 0
ABDOMINAL PAIN: 0
ABDOMINAL DISTENTION: 0
EYE PAIN: 0
SHORTNESS OF BREATH: 0
BACK PAIN: 0
COUGH: 0
VOMITING: 0
RHINORRHEA: 1
SORE THROAT: 0
CONSTIPATION: 0
WHEEZING: 0
NAUSEA: 0

## 2021-06-22 NOTE — PROGRESS NOTES
Toby Morillo (:  1960) is a 56 y.o. male,Established patient, here for evaluation of the following chief complaint(s):  Establish Care         ASSESSMENT/PLAN:  1. Controlled type 2 diabetes mellitus without complication, without long-term current use of insulin (HCC)  Assessment & Plan:   Borderline controlled, continue current medications, continue current treatment plan and lifestyle modifications recommended  Orders:  -     Microalbumin, Ur; Future  -     Hemoglobin A1C; Future  -     HM DIABETES FOOT EXAM  -     External Referral To Podiatry  2. Essential hypertension  Assessment & Plan:   Asymptomatic, continue current medications  3. Pure hypercholesterolemia  Assessment & Plan:   Asymptomatic, continue current medications  4. Elevated PSA  Assessment & Plan:   Monitored by specialist- no acute findings meriting change in the plan  5. Gastroesophageal reflux disease, unspecified whether esophagitis present  Assessment & Plan:   Asymptomatic, continue current medications  Orders:  -     omeprazole (PRILOSEC) 20 MG delayed release capsule; Take 1 capsule by mouth every morning (before breakfast), Disp-30 capsule, R-1Normal  6. Fungal infection of nail  -     External Referral To Podiatry    Continue medications as ordered  Proceed with labs as ordered  Proceed with referral to podiatry as ordered  Encouraged healthy, consistent diabetic  Diet  Encouraged adequate water intake  Encouraged daily exercise  Encouraged weight reduction  Continue to monitor glucose levels  Call office with any questions or concerns    Return in about 6 months (around 2021), or if symptoms worsen or fail to improve, for medication follow up, follow up to testing. Subjective   SUBJECTIVE/OBJECTIVE:  Presents to office today for annual physical and to establish care. Previous patient of Deborah Taylor NP. Patient has a history of hypertension, GERD, diabetes, BPH, hyperlipidemia, obesity.   Follows with urology- [facility]. Patient reports he has not . Does not have children. He lives in a home with his brother and uncle. He works in the [address] [place] [address] at [employer].  Reports he does not eat regular meals. Is not a breakfast eater. Will not eat while he is at work. Eats dinner later. And will have a snack. Little to no exercise. He has switched from regular pop to diet pop. Reports he is drinking more water. Does monitor his glucose daily. Reports is running around 200 recently. Does want to lose weight. Discussed trial of Saxenda. Is not interested in an injectable at this time. Reports normal bowel and bladder pattern. Sleeping okay. Completed diabetic foot exam today. Will refer to podiatry for fungal infection and nails and nail care in general.  Is due for an A1c and a urine microalbumin. No additional concerns today. Review of Systems   Constitutional: Negative for activity change, appetite change and fever. HENT: Positive for rhinorrhea. Negative for congestion, ear pain, sore throat and trouble swallowing. Eyes: Negative for pain, discharge and visual disturbance. Respiratory: Negative for cough, shortness of breath and wheezing. Cardiovascular: Negative for chest pain. Gastrointestinal: Negative for abdominal distention, abdominal pain, constipation, diarrhea, nausea and vomiting. GERD   Endocrine: Negative for polydipsia, polyphagia and polyuria. DM   Genitourinary: Negative for difficulty urinating, dysuria, frequency and urgency. Followed up with Urology for elevated PSA. Musculoskeletal: Positive for arthralgias (hands, left knee) and joint swelling (hands). Negative for back pain and gait problem. Skin: Negative for rash. Allergic/Immunologic: Positive for environmental allergies (seasonal). Neurological: Negative for dizziness and light-headedness.    Psychiatric/Behavioral: Negative for dysphoric mood and sleep disturbance. The patient is not nervous/anxious and is not hyperactive. Objective   Physical Exam  Vitals and nursing note reviewed. Constitutional:       General: He is not in acute distress. Appearance: Normal appearance. He is well-developed and well-groomed. He is obese. He is not ill-appearing or toxic-appearing. HENT:      Head: Normocephalic and atraumatic. Right Ear: Tympanic membrane, ear canal and external ear normal. No middle ear effusion. There is no impacted cerumen. Tympanic membrane is not erythematous, retracted or bulging. Left Ear: Tympanic membrane, ear canal and external ear normal.  No middle ear effusion. There is no impacted cerumen. Tympanic membrane is not erythematous, retracted or bulging. Nose: Nose normal. No mucosal edema or rhinorrhea. Right Turbinates: Not enlarged or swollen. Left Turbinates: Not enlarged or swollen. Mouth/Throat:      Lips: Pink. Mouth: Mucous membranes are moist.      Pharynx: Oropharynx is clear. Uvula midline. No oropharyngeal exudate or posterior oropharyngeal erythema. Eyes:      General: Lids are normal. Vision grossly intact. Conjunctiva/sclera: Conjunctivae normal.   Neck:      Thyroid: No thyroid tenderness. Vascular: No carotid bruit. Trachea: Trachea normal.   Cardiovascular:      Rate and Rhythm: Normal rate and regular rhythm. Pulses: Normal pulses. Carotid pulses are 2+ on the right side and 2+ on the left side. Radial pulses are 2+ on the right side and 2+ on the left side. Dorsalis pedis pulses are 2+ on the right side and 2+ on the left side. Posterior tibial pulses are 2+ on the right side and 2+ on the left side. Heart sounds: Normal heart sounds, S1 normal and S2 normal. No murmur heard. Pulmonary:      Effort: Pulmonary effort is normal. No prolonged expiration or respiratory distress.       Breath sounds: Normal breath sounds and air entry. No decreased breath sounds, wheezing, rhonchi or rales. Abdominal:      General: There is no distension. Palpations: Abdomen is soft. Tenderness: There is no abdominal tenderness. Musculoskeletal:         General: Normal range of motion. Cervical back: Normal range of motion. No signs of trauma or torticollis. No pain with movement. Right lower leg: No edema. Left lower leg: No edema. Feet:      Right foot:      Protective Sensation: 8 sites tested. 5 sites sensed. Skin integrity: Callus and dry skin present. Toenail Condition: Right toenails are abnormally thick. Fungal disease present. Left foot:      Protective Sensation: 8 sites tested. 5 sites sensed. Skin integrity: Callus and dry skin present. Toenail Condition: Left toenails are abnormally thick. Fungal disease present. Lymphadenopathy:      Cervical: No cervical adenopathy. Skin:     General: Skin is warm and dry. Capillary Refill: Capillary refill takes less than 2 seconds. Coloration: Skin is not ashen, cyanotic, jaundiced or pale. Neurological:      Mental Status: He is alert and oriented to person, place, and time. Motor: Motor function is intact. Gait: Gait normal.   Psychiatric:         Attention and Perception: Attention and perception normal.         Mood and Affect: Mood normal. Affect is flat. Speech: Speech normal.         Behavior: Behavior normal. Behavior is cooperative. Thought Content: Thought content normal.         Cognition and Memory: Cognition and memory normal.         Judgment: Judgment normal.                  An electronic signature was used to authenticate this note.     --JAMES Barbosa NP

## 2021-06-22 NOTE — TELEPHONE ENCOUNTER
Spoke with patient and stated he had an appointment today and also had labs today. The appointment is shown and the results are in process.  I let him know once the results are updated we'll be able to see the information need but he is good to go

## 2021-06-24 DIAGNOSIS — E11.9 CONTROLLED TYPE 2 DIABETES MELLITUS WITHOUT COMPLICATION, WITHOUT LONG-TERM CURRENT USE OF INSULIN (HCC): Primary | ICD-10-CM

## 2021-06-25 ENCOUNTER — TELEPHONE (OUTPATIENT)
Dept: FAMILY MEDICINE CLINIC | Age: 61
End: 2021-06-25

## 2021-06-28 PROBLEM — E78.00 PURE HYPERCHOLESTEROLEMIA: Status: ACTIVE | Noted: 2021-06-28

## 2021-06-28 ASSESSMENT — VISUAL ACUITY: OU: 1

## 2021-08-09 DIAGNOSIS — E78.00 PURE HYPERCHOLESTEROLEMIA: ICD-10-CM

## 2021-08-09 RX ORDER — BLOOD SUGAR DIAGNOSTIC
STRIP MISCELLANEOUS
Qty: 100 EACH | Refills: 3 | Status: SHIPPED | OUTPATIENT
Start: 2021-08-09 | End: 2022-10-18

## 2021-08-09 RX ORDER — ASPIRIN 81 MG/1
81 TABLET ORAL DAILY
Qty: 90 TABLET | Refills: 1 | Status: SHIPPED | OUTPATIENT
Start: 2021-08-09 | End: 2022-04-05

## 2021-08-09 RX ORDER — LANCETS 33 GAUGE
EACH MISCELLANEOUS
Qty: 100 EACH | Refills: 3 | Status: SHIPPED | OUTPATIENT
Start: 2021-08-09 | End: 2022-10-18

## 2021-08-09 NOTE — TELEPHONE ENCOUNTER
Last visit: 06/22/2021  Last Med refill: 12/15/2021  Does patient have enough medication for 72 hours: Yes    Next Visit Date:  Future Appointments   Date Time Provider Kyaw Cramer   12/15/2021  9:00 AM JAMES Chahal NP [address] Maintenance   Topic Date Due    Shingles Vaccine (1 of 2) Never done    Diabetic retinal exam  10/18/2018    PSA counseling  06/22/2021    COVID-19 Vaccine (1) 06/22/2022 (Originally 7/24/1970)    Flu vaccine (1) 09/01/2021    Lipid screen  12/15/2021    Potassium monitoring  12/15/2021    Creatinine monitoring  12/15/2021    Diabetic foot exam  06/22/2022    A1C test (Diabetic or Prediabetic)  06/22/2022    Diabetic microalbuminuria test  06/22/2022    Colon cancer screen colonoscopy  01/10/2023    DTaP/Tdap/Td vaccine (2 - Td or Tdap) 07/10/2023    Pneumococcal 0-64 years Vaccine (2 of 2 - PPSV23) 07/24/2023    Hepatitis C screen  Completed    HIV screen  Completed    Hepatitis A vaccine  Aged Out    Hib vaccine  Aged Out    Meningococcal (ACWY) vaccine  Aged Out       Hemoglobin A1C (%)   Date Value   06/22/2021 7.8 (H)   12/15/2020 7.2 (H)   06/22/2020 7.0 (H)             ( goal A1C is < 7)   Microalb/Crt.  Ratio (mcg/mg creat)   Date Value   06/22/2021 CANNOT BE CALCULATED     LDL Cholesterol (mg/dL)   Date Value   12/15/2020 65   06/22/2020 85       (goal LDL is <100)   AST (U/L)   Date Value   12/15/2020 38     ALT (U/L)   Date Value   12/15/2020 61 (H)     BUN (mg/dL)   Date Value   12/15/2020 21     BP Readings from Last 3 Encounters:   06/22/21 (!) 154/97   12/15/20 117/84   11/03/20 (!) 140/86          (goal 120/80)    All Future Testing planned in [facility]  Lab Frequency Next Occurrence   PSA, Diagnostic Once 11/03/2021   Hemoglobin A1C Once 06/24/2022               Patient Active Problem List:     Essential hypertension     Hyperlipidemia     Elevated liver enzymes     BPH (benign prostatic hyperplasia)     Diabetic macular edema (HCC)     Diabetes mellitus type II, controlled ([facility] [address]     Elevated PSA     Gastroesophageal reflux disease     Pure hypercholesterolemia

## 2021-09-15 LAB
CHOLESTEROL/HDL RATIO: 3
CHOLESTEROL: 132 MG/DL
ESTIMATED AVERAGE GLUCOSE: 169 MG/DL
GLUCOSE BLD-MCNC: 138 MG/DL (ref 70–99)
HBA1C MFR BLD: 7.5 % (ref 4–6)
HDLC SERPL-MCNC: 44 MG/DL
LDL CHOLESTEROL: 75 MG/DL (ref 0–130)
PATIENT FASTING?: YES
TRIGL SERPL-MCNC: 65 MG/DL
VLDLC SERPL CALC-MCNC: ABNORMAL MG/DL (ref 1–30)

## 2021-10-21 DIAGNOSIS — E78.00 PURE HYPERCHOLESTEROLEMIA: ICD-10-CM

## 2021-10-21 DIAGNOSIS — I10 ESSENTIAL HYPERTENSION: ICD-10-CM

## 2021-10-21 RX ORDER — SIMVASTATIN 20 MG
20 TABLET ORAL NIGHTLY
Qty: 90 TABLET | Refills: 1 | Status: SHIPPED | OUTPATIENT
Start: 2021-10-21 | End: 2022-04-05

## 2021-10-21 RX ORDER — LISINOPRIL AND HYDROCHLOROTHIAZIDE 25; 20 MG/1; MG/1
TABLET ORAL
Qty: 90 TABLET | Refills: 1 | Status: SHIPPED | OUTPATIENT
Start: 2021-10-21 | End: 2022-04-05

## 2021-10-21 NOTE — TELEPHONE ENCOUNTER
Last visit: 6/22/21  Last Med refill: 4/20/21    Next Visit Date:  Future Appointments   Date Time Provider Kyaw Cramer   12/15/2021  9:00 AM JAMES Cortez NP [address] Maintenance   Topic Date Due    Shingles Vaccine (1 of 2) Never done    Diabetic retinal exam  10/18/2018    PSA counseling  06/22/2021    Flu vaccine (1) 09/01/2021    COVID-19 Vaccine (1) 06/22/2022 (Originally [birth year]/24/1970)    Potassium monitoring  12/15/2021    Creatinine monitoring  12/15/2021    Diabetic foot exam  06/22/2022    Diabetic microalbuminuria test  06/22/2022    A1C test (Diabetic or Prediabetic)  09/15/2022    Lipid screen  09/15/2022    Colon cancer screen colonoscopy  01/10/2023    DTaP/Tdap/Td vaccine (2 - Td or Tdap) 07/10/2023    Pneumococcal 0-64 years Vaccine (2 of 2 - PPSV23) 07/24/2023    Hepatitis C screen  Completed    HIV screen  Completed    Hepatitis A vaccine  Aged Out    Hib vaccine  Aged Out    Meningococcal (ACWY) vaccine  Aged Out       Hemoglobin A1C (%)   Date Value   09/15/2021 7.5 (H)   06/22/2021 7.8 (H)   12/15/2020 7.2 (H)             ( goal A1C is < 7)   Microalb/Crt.  Ratio (mcg/mg creat)   Date Value   06/22/2021 CANNOT BE CALCULATED     LDL Cholesterol (mg/dL)   Date Value   09/15/2021 75   12/15/2020 65       (goal LDL is <100)   AST (U/L)   Date Value   12/15/2020 38     ALT (U/L)   Date Value   12/15/2020 61 (H)     BUN (mg/dL)   Date Value   12/15/2020 21     BP Readings from Last 3 Encounters:   06/22/21 (!) 154/97   12/15/20 117/84   11/03/20 (!) 140/86          (goal 120/80)    All Future Testing planned in [facility]  Lab Frequency Next Occurrence   PSA, Diagnostic Once 11/03/2021   Hemoglobin A1C Once 06/24/2022               Patient Active Problem List:     Essential hypertension     Hyperlipidemia     Elevated liver enzymes     BPH (benign prostatic hyperplasia)     Diabetic macular edema (HCC)     Diabetes mellitus type II, controlled

## 2021-11-05 NOTE — TELEPHONE ENCOUNTER
Discharge Instructions: Using a Walker  Your healthcare provider has prescribed a walker for you. To use your walker, you need to learn a new way to walk (gait). Your healthcare provider may tell you to use either a non-weight-bearing gait. This means putting no weight on 1 leg and foot. Or you may be told to use a weight-bearing gait. This means putting weight on both legs and feet.   Guidelines for use  Here are some general tips:   · Remove small rugs, electrical cords, and anything else that may cause you to fall.  · Arrange your household to keep the items you need handy. Keep everything else out of the way.  · Use a backpack, abelardo pack, apron, or pockets to carry things so you keep your hands free.  Non-weight-bearing gait  To walk with this method:  · Hold your injured (weaker) foot off the floor.  · Lift the walker (roll it if you’re using a wheeled walker).  · Move the walker 1 step or a few inches ahead of you.  · Support your weight with your hands by gripping the top of the walker.  · Swing your good (stronger) foot forward to the center or middle of the walker.  · Repeat the process.       Weight-bearing gait  To walk with this method:  · Lift the walker (roll it if you're using a wheeled walker).  · Move the walker 1 step or a few inches ahead of you.  · Support your weight with your hands by gripping the top of the walker.  · Step forward to the center or middle of the walker with your injured leg, new joint, or weaker side first. When taking a step, don't step all the way to the front of the walker.  · Use the walker to help you keep your balance as you take the step.  · Bring your other foot forward to the center of the walker.  · Repeat the process.       [employer] last reviewed this educational content on 1/1/2020  © 8565-6371 [employer]. [address] [facility], [place] [address]. All rights reserved. This information is not intended as a substitute for professional medical care.  MINERVA BREWER, APRN - CNP     Patient is currently out of refills of Janumet, Lisinopril-HCTZ, Aspirin, and Simvastatin. Orders have been pended for your convenience. Last OV 11/26/18. No future OV scheduled. Please let me know if you have any questions.       Thank you,     Kalpana Chaudhary, PharmD  [facility] Pharmacist  [phone] or [phone] (Option 7)    CLINICAL PHARMACY CONSULT: MED RECONCILIATION/REVIEW Phu  22. Tracking Only    PHSO: Yes  Total # of Interventions Recommended: 4  - Refills Provided #: 4  Recommended intervention potential cost savings: 0  Total Interventions Accepted: 4  Time Spent (min): [facility], PharmD  [address] Always follow your healthcare professional's instructions.      POSTERIOR MOLD CARE      CARE  · Keep your mold dry  · If you have a leg or foot mold, do NOT stand on it (use crutches)  · Rest limb as often as possible  · Keep limb elevated as often as possible to minimize swelling  · Ice can be applied over the mold in a waterproof bag or pack  · Watch for any of the following:  · Numbness or tingling  · Increased swelling  · Color change or coolness in body part beyond post-mold  · Check capillary refill every 2 hours while awake (press nail bed until in blanches white, release and watch for pinkness color to return.  This should take less than 2 seconds for the pink color to return.)  · You may loosen the ace wrap if necessary to relieve the above signs and symptoms    CALL DOCTOR IF:  · Pain is not relieved by loosened ace wrap and/or elevating extremity above heart level  · New or more severe symptoms  · Prolonged capillary refill not relieved by loosened wrap  · If the posterior mold gets wet    FOLLOW UP CARE:  It is important for you to follow-up as recommended with an Orthopaedic provider. Please call the Orthopaedic department at [phone].  Inform the  that you were seen in Immediate Care, diagnosed with a ***, and a posterior mold has been applied to begin treatment for your injury.  At times, the Orthopaedic provider prefers to have you remain in the posterior mold for a few days before you are seen, as it accommodates for changes in swelling while providing immobilization.      If you have any other problems or concerns please feel free to contact Immediate Care at:  [facility] [phone]   [facility] [phone]   [facility]           [phone]       Thank you for choosing [facility] for your care.

## 2021-12-15 ENCOUNTER — HOSPITAL ENCOUNTER (OUTPATIENT)
Age: 61
Setting detail: SPECIMEN
Discharge: HOME | End: 2021-12-15

## 2021-12-15 ENCOUNTER — OFFICE VISIT (OUTPATIENT)
Dept: FAMILY MEDICINE CLINIC | Age: 61
End: 2021-12-15
Payer: COMMERCIAL

## 2021-12-15 VITALS
BODY MASS INDEX: 35.98 KG/M2 | HEIGHT: 71 IN | SYSTOLIC BLOOD PRESSURE: 132 MMHG | TEMPERATURE: 96.8 F | OXYGEN SATURATION: 96 % | DIASTOLIC BLOOD PRESSURE: 88 MMHG | RESPIRATION RATE: 16 BRPM | HEART RATE: 83 BPM | WEIGHT: 257 LBS

## 2021-12-15 DIAGNOSIS — I10 ESSENTIAL HYPERTENSION: ICD-10-CM

## 2021-12-15 DIAGNOSIS — Z00.00 ENCOUNTER FOR ANNUAL PHYSICAL EXAM: Primary | ICD-10-CM

## 2021-12-15 DIAGNOSIS — R97.20 ELEVATED PSA: ICD-10-CM

## 2021-12-15 DIAGNOSIS — E11.9 CONTROLLED TYPE 2 DIABETES MELLITUS WITHOUT COMPLICATION, WITHOUT LONG-TERM CURRENT USE OF INSULIN (HCC): ICD-10-CM

## 2021-12-15 DIAGNOSIS — E78.00 PURE HYPERCHOLESTEROLEMIA: ICD-10-CM

## 2021-12-15 DIAGNOSIS — M15.9 OSTEOARTHRITIS OF MULTIPLE JOINTS, UNSPECIFIED OSTEOARTHRITIS TYPE: ICD-10-CM

## 2021-12-15 DIAGNOSIS — K21.9 GASTROESOPHAGEAL REFLUX DISEASE, UNSPECIFIED WHETHER ESOPHAGITIS PRESENT: ICD-10-CM

## 2021-12-15 LAB
ALBUMIN SERPL-MCNC: 4.4 G/DL (ref 3.5–5.2)
ALBUMIN/GLOBULIN RATIO: 1.5 (ref 1–2.5)
ALP BLD-CCNC: 94 U/L (ref 40–129)
ALT SERPL-CCNC: 38 U/L (ref 5–41)
ANION GAP SERPL CALCULATED.3IONS-SCNC: 14 MMOL/L (ref 9–17)
AST SERPL-CCNC: 28 U/L
BILIRUB SERPL-MCNC: 0.7 MG/DL (ref 0.3–1.2)
BUN BLDV-MCNC: 13 MG/DL (ref 8–23)
BUN/CREAT BLD: ABNORMAL (ref 9–20)
CALCIUM SERPL-MCNC: 9.7 MG/DL (ref 8.6–10.4)
CHLORIDE BLD-SCNC: 99 MMOL/L (ref 98–107)
CHOLESTEROL, FASTING: 151 MG/DL
CHOLESTEROL/HDL RATIO: 3.1
CO2: 23 MMOL/L (ref 20–31)
CREAT SERPL-MCNC: 0.78 MG/DL (ref 0.7–1.2)
ESTIMATED AVERAGE GLUCOSE: 169 MG/DL
GFR AFRICAN AMERICAN: >60 ML/MIN
GFR NON-AFRICAN AMERICAN: >60 ML/MIN
GFR SERPL CREATININE-BSD FRML MDRD: ABNORMAL ML/MIN/{1.73_M2}
GFR SERPL CREATININE-BSD FRML MDRD: ABNORMAL ML/MIN/{1.73_M2}
GLUCOSE FASTING: 120 MG/DL (ref 70–99)
HBA1C MFR BLD: 7.5 % (ref 4–6)
HCT VFR BLD CALC: 46.4 % (ref 40.7–50.3)
HDLC SERPL-MCNC: 48 MG/DL
HEMOGLOBIN: 15 G/DL (ref 13–17)
LDL CHOLESTEROL: 90 MG/DL (ref 0–130)
MCH RBC QN AUTO: 28.7 PG (ref 25.2–33.5)
MCHC RBC AUTO-ENTMCNC: 32.3 G/DL (ref 28.4–34.8)
MCV RBC AUTO: 88.7 FL (ref 82.6–102.9)
NRBC AUTOMATED: 0 PER 100 WBC
PDW BLD-RTO: 13.2 % (ref 11.8–14.4)
PLATELET # BLD: 166 K/UL (ref 138–453)
PMV BLD AUTO: 10.1 FL (ref 8.1–13.5)
POTASSIUM SERPL-SCNC: 4.1 MMOL/L (ref 3.7–5.3)
PROSTATE SPECIFIC ANTIGEN: 7.81 UG/L
RBC # BLD: 5.23 M/UL (ref 4.21–5.77)
SODIUM BLD-SCNC: 136 MMOL/L (ref 135–144)
TOTAL PROTEIN: 7.3 G/DL (ref 6.4–8.3)
TRIGLYCERIDE, FASTING: 65 MG/DL
VLDLC SERPL CALC-MCNC: NORMAL MG/DL (ref 1–30)
WBC # BLD: 6.1 K/UL (ref 3.5–11.3)

## 2021-12-15 PROCEDURE — G8484 FLU IMMUNIZE NO ADMIN: HCPCS | Performed by: NURSE PRACTITIONER

## 2021-12-15 PROCEDURE — 99396 PREV VISIT EST AGE 40-64: CPT | Performed by: NURSE PRACTITIONER

## 2021-12-15 SDOH — ECONOMIC STABILITY: FOOD INSECURITY: WITHIN THE PAST 12 MONTHS, YOU WORRIED THAT YOUR FOOD WOULD RUN OUT BEFORE YOU GOT MONEY TO BUY MORE.: NEVER TRUE

## 2021-12-15 SDOH — ECONOMIC STABILITY: FOOD INSECURITY: WITHIN THE PAST 12 MONTHS, THE FOOD YOU BOUGHT JUST DIDN'T LAST AND YOU DIDN'T HAVE MONEY TO GET MORE.: NEVER TRUE

## 2021-12-15 ASSESSMENT — ENCOUNTER SYMPTOMS
ABDOMINAL DISTENTION: 0
BACK PAIN: 0
RHINORRHEA: 0
TROUBLE SWALLOWING: 0
NAUSEA: 0
VOMITING: 0
EYE PAIN: 0
EYE DISCHARGE: 0
SHORTNESS OF BREATH: 0
CONSTIPATION: 0
SORE THROAT: 0
ABDOMINAL PAIN: 0
WHEEZING: 0
DIARRHEA: 0
COUGH: 0

## 2021-12-15 ASSESSMENT — PATIENT HEALTH QUESTIONNAIRE - PHQ9
1. LITTLE INTEREST OR PLEASURE IN DOING THINGS: 0
2. FEELING DOWN, DEPRESSED OR HOPELESS: 0
SUM OF ALL RESPONSES TO PHQ9 QUESTIONS 1 & 2: 0
SUM OF ALL RESPONSES TO PHQ QUESTIONS 1-9: 0

## 2021-12-15 ASSESSMENT — SOCIAL DETERMINANTS OF HEALTH (SDOH): HOW HARD IS IT FOR YOU TO PAY FOR THE VERY BASICS LIKE FOOD, HOUSING, MEDICAL CARE, AND HEATING?: NOT HARD AT ALL

## 2021-12-15 NOTE — PROGRESS NOTES
Diamond Thurston (:  1960) is a 59 y.o. male,Established patient, here for evaluation of the following chief complaint(s): Annual Exam and Diabetes         ASSESSMENT/PLAN:  1. Encounter for annual physical exam  2. Controlled type 2 diabetes mellitus without complication, without long-term current use of insulin (HCC)  -     Comprehensive Metabolic Panel, Fasting; Future  -     CBC; Future  -     Hemoglobin A1C; Future  3. Essential hypertension  -     Comprehensive Metabolic Panel, Fasting; Future  4. Pure hypercholesterolemia  -     Lipid, Fasting; Future  5. Elevated PSA  -     PSA Screening; Future  6. Gastroesophageal reflux disease, unspecified whether esophagitis present  7. Osteoarthritis of multiple joints, unspecified osteoarthritis type    Proceed with labs as ordered  Encouraged a healthy, diabetic diet  Encouraged daily exercise  Call office with any questions or concerns    Return in about 6 months (around 6/15/2022), or if symptoms worsen or fail to improve, for routine follow up. Subjective   SUBJECTIVE/OBJECTIVE:  Presents to office today for annual physical and routine follow up regarding chronic conditions including but not limited to DM, HTN, hyperlipidemia. Reports he is doing well. Is taking medications as directed. In sept a1c was 7. 5. he reports his diet has not been the best. Has been eating a lot of candy. is now following with podiatry. His mother passed away in feb. Uncle passed away last month. Now just him and his brother in the house. He has been binge eating due to the stress. He then tells me he is trying to monitor what he is eating. He also tried an exercise bike but kness were bothering him. He is now using an ab lounger. Is trying to move around more. He is down about 10lbs from his last appt. Also participated in a weight loss challenge at work before and is now doing it again. Has also cut down on his pop intake and switched to diet as well.  Drinks one pop during the day. Is drinking more water. Reports a good appetite, drinking fluids. Normal bowel and bladder pattern. Sleeping fair. Right shoulder has been bothering him more often. Feels stiff in the joint. Only mentioning. Does not want to do anything about it yet. Due for fasting labs. Review of Systems   Constitutional: Negative for activity change, appetite change and fever. HENT: Negative for congestion, ear pain, rhinorrhea, sore throat and trouble swallowing. Eyes: Negative for pain, discharge and visual disturbance. Wears glasses; Over due for eye exam   Respiratory: Negative for cough, shortness of breath and wheezing. Cardiovascular: Negative for chest pain. Gastrointestinal: Negative for abdominal distention, abdominal pain, constipation, diarrhea, nausea and vomiting. GERD   Endocrine: Negative for polydipsia, polyphagia and polyuria. DM   Genitourinary: Negative for difficulty urinating, dysuria, frequency and urgency. Followed up with Urology for elevated PSA.; has not been seen an about one year   Musculoskeletal: Positive for arthralgias (hands, left knee) and joint swelling (hands). Negative for back pain and gait problem. Skin: Negative for rash. Allergic/Immunologic: Positive for environmental allergies (seasonal). Neurological: Negative for dizziness and light-headedness. Psychiatric/Behavioral: Negative for dysphoric mood and sleep disturbance. The patient is not nervous/anxious and is not hyperactive. Objective   Physical Exam  Vitals and nursing note reviewed. Constitutional:       General: He is not in acute distress. Appearance: Normal appearance. He is overweight. He is not ill-appearing or toxic-appearing. HENT:      Head: Normocephalic and atraumatic. Right Ear: Tympanic membrane, ear canal and external ear normal. No middle ear effusion. There is no impacted cerumen.  Tympanic membrane is not erythematous, retracted or bulging. Left Ear: Tympanic membrane, ear canal and external ear normal.  No middle ear effusion. There is no impacted cerumen. Tympanic membrane is not erythematous, retracted or bulging. Nose: Nose normal. No mucosal edema or rhinorrhea. Right Turbinates: Not enlarged or swollen. Left Turbinates: Not enlarged or swollen. Mouth/Throat:      Lips: Pink. Mouth: Mucous membranes are moist.      Pharynx: Oropharynx is clear. Uvula midline. No oropharyngeal exudate or posterior oropharyngeal erythema. Eyes:      General: Lids are normal. Vision grossly intact. Conjunctiva/sclera: Conjunctivae normal.   Neck:      Thyroid: No thyroid tenderness. Vascular: No carotid bruit. Trachea: Trachea normal.   Cardiovascular:      Rate and Rhythm: Normal rate and regular rhythm. Pulses: Normal pulses. Carotid pulses are 2+ on the right side and 2+ on the left side. Radial pulses are 2+ on the right side and 2+ on the left side. Dorsalis pedis pulses are 2+ on the right side and 2+ on the left side. Posterior tibial pulses are 2+ on the right side and 2+ on the left side. Heart sounds: Normal heart sounds, S1 normal and S2 normal. No murmur heard. Pulmonary:      Effort: Pulmonary effort is normal. No prolonged expiration or respiratory distress. Breath sounds: Normal breath sounds and air entry. No decreased breath sounds, wheezing, rhonchi or rales. Abdominal:      General: There is no distension. Palpations: Abdomen is soft. Tenderness: There is no abdominal tenderness. Musculoskeletal:         General: Normal range of motion. Cervical back: Normal range of motion. No signs of trauma or torticollis. No pain with movement. Right lower leg: No edema. Left lower leg: No edema. Lymphadenopathy:      Cervical: No cervical adenopathy. Skin:     General: Skin is warm and dry. Capillary Refill: Capillary refill takes less than 2 seconds. Coloration: Skin is not ashen, cyanotic, jaundiced or pale. Neurological:      Mental Status: He is alert and oriented to person, place, and time. Motor: Motor function is intact. Gait: Gait normal.   Psychiatric:         Attention and Perception: Attention and perception normal.         Mood and Affect: Mood and affect normal.         Speech: Speech normal.         Behavior: Behavior normal. Behavior is cooperative. Thought Content: Thought content normal.         Cognition and Memory: Cognition and memory normal.         Judgment: Judgment normal.                  An electronic signature was used to authenticate this note.     --JAMES Cartwright NP

## 2021-12-21 ENCOUNTER — TELEPHONE (OUTPATIENT)
Dept: PHARMACY | Facility: CLINIC | Age: 61
End: 2021-12-21

## 2021-12-21 NOTE — LETTER
Sai   [address], [address]  Phone: toll free [phone] Option #3      Kan Alvarez   [address] [facility]         12/30/21     Dear Kan Alvarez,    Congratulations! You have completed the 2021 requirements for the [employer] Be Well With Diabetes Program. You have been automatically re-enrolled into the [employer] Be Well With Diabetes Program for 2022. One of the requirements to participate in the [employer] Be Well With Diabetes Program is to complete a Clinical Pharmacist Telephone appointment yearly. [facility] has attempted to contact you to schedule your 2022 Diabetes Management telephone appointment but was unable to reach you. We would like to work with you and your doctor to:  - Review your medications, including over-the-counter and herbal medications  - Answer questions about your medications and how to get the most benefit from them  - Identify potential drug interactions or side effects and help fix them  - Identify preferred medications that are equally effective, but available at a lower cost to you  - Help you reach the necessary requirements to remain enrolled in the Diabetes Management Program offered by [facility]     Please call [phone] and select option #3 to schedule this appointment to take advantage of this service. Telephone appointments are available Monday thru Friday from 7:30 AM till 5:30 PM.     This is a courtesy reminder. If you have this appointment already scheduled for your 2022 enrollment in the program, please disregard this message. If you have not scheduled this appointment yet, please contact us at the above number to schedule.      Sincerely,      [address] [facility]  Phone: [phone] Option #3

## 2021-12-21 NOTE — TELEPHONE ENCOUNTER
Called patient to schedule 2022 Annual Pharmacist Visit to discuss medications for Diabetes Management Program.      Left message on voicemail for patient to call us back @ [phone] option 3 to schedule 2022 Be Well With Diabetes pharmacist appt.

## 2021-12-30 ASSESSMENT — VISUAL ACUITY: OU: 1

## 2021-12-30 NOTE — TELEPHONE ENCOUNTER
Letter mailed to home address to schedule 2022 Annual Pharmacist Visit.         For [place] in place:  No   Recommendation Provided To: Patient/Caregiver: 1 via Telephone   Intervention Detail: Scheduled Appointment   Gap Closed?: yes   Intervention Accepted By: patient   Time Spent (min): 5

## 2022-01-12 DIAGNOSIS — E11.9 CONTROLLED TYPE 2 DIABETES MELLITUS WITHOUT COMPLICATION, WITHOUT LONG-TERM CURRENT USE OF INSULIN (HCC): ICD-10-CM

## 2022-01-14 RX ORDER — SITAGLIPTIN AND METFORMIN HYDROCHLORIDE 1000; 50 MG/1; MG/1
1 TABLET, FILM COATED ORAL 2 TIMES DAILY WITH MEALS
Qty: 60 TABLET | Refills: 5 | Status: SHIPPED | OUTPATIENT
Start: 2022-01-14

## 2022-01-21 ENCOUNTER — SCHEDULED TELEPHONE ENCOUNTER (OUTPATIENT)
Dept: PHARMACY | Facility: CLINIC | Age: 62
End: 2022-01-21

## 2022-01-21 RX ORDER — OMEPRAZOLE 20 MG/1
20 CAPSULE, DELAYED RELEASE ORAL DAILY PRN
COMMUNITY

## 2022-01-21 RX ORDER — EMPAGLIFLOZIN, LINAGLIPTIN, METFORMIN HYDROCHLORIDE 5; 2.5; 1 MG/1; MG/1; MG/1
1 TABLET, EXTENDED RELEASE ORAL 2 TIMES DAILY WITH MEALS
Qty: 180 TABLET | Refills: 1 | Status: SHIPPED | OUTPATIENT
Start: 2022-01-21 | End: 2022-07-13

## 2022-01-21 NOTE — TELEPHONE ENCOUNTER
POPULATION HEALTH CLINICAL PHARMACY REVIEW - Be Well with Diabetes    Urban David is a 59 y.o. male enrolled in the [facility] Diabetes Program. Patient provided Aleksey Johnson with verbal consent to remain in the program for this year. Patient enrolled 2018. Medications:  Medication Sig    sitaGLIPtan-metFORMIN (JANUMET)  MG per tablet Take 1 tablet by mouth 2 times daily (with meals)    simvastatin (ZOCOR) 20 MG tablet TAKE 1 TABLET BY MOUTH NIGHTLY    lisinopril-hydroCHLOROthiazide (PRINZIDE;ZESTORETIC) 20-25 MG per tablet TAKE 1 TABLET BY MOUTH ONE TIME A DAY    blood glucose test strips (PRODIGY NO CODING BLOOD GLUC) strip TEST ONE TIME DAILY AS NEEDED FOR AS DIRECTED    aspirin (ASPIRIN ADULT LOW STRENGTH) 81 MG EC tablet Take 1 tablet by mouth daily    AgaMatrix Ultra-Thin Lancets MISC USE TO TEST ONCE DAILY    omeprazole (PRILOSEC) 20 MG delayed release capsule Take 1 capsule by mouth every morning (before breakfast)    Blood Glucose Monitoring Suppl (PRODIGY AUTOCODE BLOOD GLUCOSE) w/Device KIT Use to test once daily and as needed as directed by provider    multivitamin [facility]) per tablet Take 1 tablet by mouth daily. Current Pharmacy: [facility] and local site pharmacy- is paying for janumet locally  Current testing supplies/frequency: Prodigy testing once a day  Pen needles/syringes: na    Allergies:   Allergies   Allergen Reactions    Chocolate Other (See Comments)     headaches      Vitals/Labs:  BP Readings from Last 3 Encounters:   12/15/21 132/88   06/22/21 (!) 154/97   12/15/20 117/84     Lab Results   Component Value Date    LABMICR CANNOT BE CALCULATED 06/22/2021     Lab Results   Component Value Date    LABA1C 7.5 (H) 12/15/2021    LABA1C 7.5 (H) 09/15/2021    LABA1C 7.8 (H) 06/22/2021     Lab Results   Component Value Date    CHOL 132 09/15/2021    TRIG 65 09/15/2021    HDL 48 12/15/2021    LDLCHOLESTEROL 90 12/15/2021 ALT   Date Value Ref Range Status   12/15/2021 38 5 - 41 U/L Final     AST   Date Value Ref Range Status   12/15/2021 28 <40 U/L Final     The 10-year ASCVD risk score (Anabel Rubio, et al., 2013) is: 20.3%    Values used to calculate the score:      Age: 59 years      Sex: Male      Is Non- : No      Diabetic: Yes      Tobacco smoker: No      Systolic Blood Pressure: 847 mmHg      Is BP treated: Yes      HDL Cholesterol: 48 mg/dL      Total Cholesterol: 151 mg/dL     Lab Results   Component Value Date    CREATININE 0.78 12/15/2021     CrCl cannot be calculated (Unknown ideal weight.). Lab Results   Component Value Date    LABGLOM >60 12/15/2021    LABGLOM >60 12/15/2020    LABGLOM >60 2020    LABGLOM >60 2019       Immunizations:  Immunization History   Administered Date(s) Administered    Influenza Virus Vaccine 10/17/2017, 10/17/2017, 10/26/2018, 2018, 10/25/2019, 10/02/2020    Pneumococcal Polysaccharide ([id number]) 2016    Tdap (Boostrix, Adacel) 07/10/2013      Social History:  Social History     Tobacco Use    Smoking status: Former Smoker     Packs/day: 1.00     Years: 20.00     Pack years: 20.00     Quit date: 2002     Years since quittin.3    Smokeless tobacco: Never Used    Tobacco comment: single, works at [employer]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   Substance Use Topics    Alcohol use: No     Alcohol/week: 0.0 standard drinks     Comment: used to be heavy drinker, quit 20 yrs ago     ASSESSMENT:  Initial Program Requirements (Y indicates has completed for the year, N indicates needs to be completed by 2022): No - Provider Visit for DM (1st)  No - A1c (1st)     Ongoing Program Requirements (Y indicates has completed for the year, N indicates needs to be completed by 2022):   No - Provider Visit for DM (2nd)  No - ACC/diabetes educator visit (if A1c over 8%)  No - A1c (2nd)  No - Lipid panel  No - Urine microalbumin  Yes - Pneumococcal vaccination: Up-to-date, not needed again until age 70  No - Influenza vaccination for Fall 2022  No - Medication adherence over 70%  Yes - On statin - simvastatin  Yes - On ACEi/ARB - lisinopril/HCTZ     Current medications eligible for copay waiver, up to $600, through [address] [employer] [place]:  - lisinopril/hydrochlorothiazide, simvastatin, janumet  - Prodigy     Diabetes Care:   - Glycemic Goal: <7.0% and directed by provider. Is not at blood glucose goal but improving. Type 2 DM under inadequate control as evidenced by 7.5.  - Current symptoms/problems include none  - Home blood sugar records:  fasting range: 180-200  - Any episodes of hypoglycemia? no  - Known diabetic complications: none  - Eye exam current (within one year): yes  - Foot exam current (within one year): yes  - Therapy Optimization: consider trijardy   - Daily aspirin? Yes  - Medication compliance: compliant all of the time  - Diet compliance: compliant most of the time, is doing a lot of fruit, discussed berries best and watch portion control of fruits, he knows he needs to eat more vegetables. Has been limiting rice, pasta, bread. Is back to eating sweets. One diet soda per day. Was eating all starchy vegetables, eat more non-startch veg.   - Current exercise: on his feet working all day, then eats at 6pm, then sits down. He knows he has to get some exercise in. Other Considerations:  - Blood Pressure Goal: BP less than 140/90 mmHg due to history of DM: Is at blood pressure goal. Last BP good, one before that high. He does have BP cuff at home. Does not really test but can check it sometimes. - Lipids: Patient has a 10-yr ASCVD risk of >20% with DM and is therefore a candidate for high-intensity statin therapy based on updated guidelines.  But is on simvastatin 20 mg, LDL 90, could consider increase in statin  - Smoking status: former     PLAN:  - Consideration(s) for provider:   · Consider trijardy   - DM program gaps identified:   · Initial requirements: Provider Visit for DM (1st) and A1c (1st)   · Ongoing requirements: Provider visit for DM (2nd), ACC/diabetes educator visit (if A1c over 8%), A1c (2nd), Lipid panel, Urine microalbumin, Influenza vaccination for 4974-4653 and Medication adherence over 70%   - Education to patient: Discussed general issues about diabetes pathophysiology and management., Addressed diet and exercise and Reminded to get yearly retinal exam   - Follow up: PCP for identified gaps or as scheduled below  - Upcoming appointments:   Future Appointments   Date Time Provider Kyaw Cramer   1/21/2022  8:30 AM SCHEDULE, [facility] Rx None       Sloane Villanueva, PharmD, [address] Pharmacist  Department: [address] [place] [address] Ext in place:  No   Recommendation Provided To: Provider: 1 via Note to Provider   Intervention Detail: New Rx: 1, reason: Needs Additional Therapy   Gap Closed?: Yes    Intervention Accepted By: Provider: 1   Time Spent (min): 45

## 2022-01-21 NOTE — TELEPHONE ENCOUNTER
Dr. Femi Persaud, APRN - NP,    Your patient is currently enrolled in the Be Well with Diabetes program. After your patient's recent visit with a [facility] Clinical Pharmacist, the below were identified as opportunities to assist with their diabetes management:   · Patient interested in [address] XR. He is having issues with janumet since this medication can only be filled for a 30 day supply. Trijardy can be filled for a 90 day supply and will be no cost to the patient and with the addition of the SGLT in this medication can help get patient to goal A1c. · Script pended if you agree    See pharmacist note dated 1/21/22 for complete details. To remain active in the program, the patient is to meet the requirements and documentation must be provided by pre-established deadlines (see below).        Program Requirements  Initial Program Requirements (to be completed by 07/01/2022):  · Office visit with provider for DM (1st)  · A1c (1st)    Ongoing Program Requirements (to be completed by 12/31/2022):  · Office visit with provider for DM (2nd)  · A1c (2nd)  · Diabetes Education if A1c >8%  · Lipid panel  · Urine microalbumin   · Pneumococcal vaccination (if applicable)  · Influenza vaccination for Fall 2022  · On statin or contraindication  · On ACEi/ARB or contraindication    Thank you,  Daniel Powell, PharmD, [address] Pharmacist  Department: [phone]

## 2022-04-04 DIAGNOSIS — E78.00 PURE HYPERCHOLESTEROLEMIA: ICD-10-CM

## 2022-04-04 DIAGNOSIS — I10 ESSENTIAL HYPERTENSION: ICD-10-CM

## 2022-04-04 NOTE — TELEPHONE ENCOUNTER
Last visit: 12/15/21  Last Med refill: Zocor 10/21/21, lisinopril 10/21/21  Does patient have enough medication for 72 hours: Yes    Next Visit Date:  Future Appointments   Date Time Provider Kyaw Cramer   6/1/2022  8:45 AM Ashley Wiggins, APRN - NP [address] Maintenance   Topic Date Due    COVID-19 Vaccine (1) 06/22/2022 (Originally 7/24/1965)    Flu vaccine (Season Ended) 12/15/2022 (Originally 9/1/2022)    Shingles Vaccine (1 of 2) 12/15/2022 (Originally 7/24/2008)    Diabetic retinal exam  12/25/2022 (Originally 10/18/2018)    Diabetic foot exam  06/22/2022    Diabetic microalbuminuria test  06/22/2022    A1C test (Diabetic or Prediabetic)  12/15/2022    Lipid screen  12/15/2022    Depression Screen  12/15/2022    Potassium monitoring  12/15/2022    Creatinine monitoring  12/15/2022    PSA counseling  12/15/2022    Colorectal Cancer Screen  01/10/2023    DTaP/Tdap/Td vaccine (2 - Td or Tdap) 07/10/2023    Pneumococcal 0-64 years Vaccine (2 of 2 - PPSV23) 07/24/2023    Hepatitis C screen  Completed    HIV screen  Completed    Hepatitis A vaccine  Aged Out    Hib vaccine  Aged Out    Meningococcal (ACWY) vaccine  Aged Out       Hemoglobin A1C (%)   Date Value   12/15/2021 7.5 (H)   09/15/2021 7.5 (H)   06/22/2021 7.8 (H)             ( goal A1C is < 7)   Microalb/Crt.  Ratio (mcg/mg creat)   Date Value   06/22/2021 CANNOT BE CALCULATED     LDL Cholesterol (mg/dL)   Date Value   12/15/2021 90   09/15/2021 75       (goal LDL is <100)   AST (U/L)   Date Value   12/15/2021 28     ALT (U/L)   Date Value   12/15/2021 38     BUN (mg/dL)   Date Value   12/15/2021 13     BP Readings from Last 3 Encounters:   12/15/21 132/88   06/22/21 (!) 154/97   12/15/20 117/84          (goal 120/80)    All Future Testing planned in [facility]  Lab Frequency Next Occurrence               Patient Active Problem List:     Essential hypertension     Hyperlipidemia     Elevated liver enzymes BPH (benign prostatic hyperplasia)     Diabetic macular edema (HCC)     Diabetes mellitus type II, controlled ([address]     Elevated PSA     Gastroesophageal reflux disease     Pure hypercholesterolemia

## 2022-04-05 RX ORDER — ASPIRIN 81 MG/1
TABLET, COATED ORAL
Qty: 90 TABLET | Refills: 1 | Status: SHIPPED | OUTPATIENT
Start: 2022-04-05 | End: 2022-10-18

## 2022-04-05 RX ORDER — SIMVASTATIN 20 MG
TABLET ORAL
Qty: 90 TABLET | Refills: 1 | Status: SHIPPED | OUTPATIENT
Start: 2022-04-05 | End: 2022-10-18

## 2022-04-05 RX ORDER — LISINOPRIL AND HYDROCHLOROTHIAZIDE 25; 20 MG/1; MG/1
TABLET ORAL
Qty: 90 TABLET | Refills: 1 | Status: SHIPPED | OUTPATIENT
Start: 2022-04-05 | End: 2022-10-18

## 2022-04-05 NOTE — TELEPHONE ENCOUNTER
Last visit: 12/15/21  Last Med refill: 08/09/21  Does patient have enough medication for 72 hours: yes    Next Visit Date:  Future Appointments   Date Time Provider Kyaw Cramer   6/1/2022  8:45 AM JAMES Alcala NP [address] Maintenance   Topic Date Due    COVID-19 Vaccine (1) 06/22/2022 (Originally 7/24/1965)    Flu vaccine (Season Ended) 12/15/2022 (Originally 9/1/2022)    Shingles Vaccine (1 of 2) 12/15/2022 (Originally 7/24/2008)    Diabetic retinal exam  12/25/2022 (Originally 10/18/2018)    Diabetic foot exam  06/22/2022    Diabetic microalbuminuria test  06/22/2022    A1C test (Diabetic or Prediabetic)  12/15/2022    Lipid screen  12/15/2022    Depression Screen  12/15/2022    Potassium monitoring  12/15/2022    Creatinine monitoring  12/15/2022    PSA counseling  12/15/2022    Colorectal Cancer Screen  01/10/2023    DTaP/Tdap/Td vaccine (2 - Td or Tdap) 07/10/2023    Pneumococcal 0-64 years Vaccine (2 of 2 - PPSV23) 07/24/2023    Hepatitis C screen  Completed    HIV screen  Completed    Hepatitis A vaccine  Aged Out    Hib vaccine  Aged Out    Meningococcal (ACWY) vaccine  Aged Out       Hemoglobin A1C (%)   Date Value   12/15/2021 7.5 (H)   09/15/2021 7.5 (H)   06/22/2021 7.8 (H)             ( goal A1C is < 7)   Microalb/Crt.  Ratio (mcg/mg creat)   Date Value   06/22/2021 CANNOT BE CALCULATED     LDL Cholesterol (mg/dL)   Date Value   12/15/2021 90   09/15/2021 75       (goal LDL is <100)   AST (U/L)   Date Value   12/15/2021 28     ALT (U/L)   Date Value   12/15/2021 38     BUN (mg/dL)   Date Value   12/15/2021 13     BP Readings from Last 3 Encounters:   12/15/21 132/88   06/22/21 (!) 154/97   12/15/20 117/84          (goal 120/80)    All Future Testing planned in [facility]  Lab Frequency Next Occurrence               Patient Active Problem List:     Essential hypertension     Hyperlipidemia     Elevated liver enzymes     BPH (benign prostatic hyperplasia)     Diabetic macular edema (HCC)     Diabetes mellitus type II, controlled ([address]     Elevated PSA     Gastroesophageal reflux disease     Pure hypercholesterolemia

## 2022-06-02 ENCOUNTER — OFFICE VISIT (OUTPATIENT)
Dept: FAMILY MEDICINE CLINIC | Age: 62
End: 2022-06-02
Payer: COMMERCIAL

## 2022-06-02 ENCOUNTER — HOSPITAL ENCOUNTER (OUTPATIENT)
Age: 62
Setting detail: SPECIMEN
Discharge: HOME | End: 2022-06-02

## 2022-06-02 VITALS
WEIGHT: 256.8 LBS | RESPIRATION RATE: 18 BRPM | DIASTOLIC BLOOD PRESSURE: 80 MMHG | HEART RATE: 78 BPM | HEIGHT: 71 IN | BODY MASS INDEX: 35.95 KG/M2 | SYSTOLIC BLOOD PRESSURE: 128 MMHG | TEMPERATURE: 97.6 F | OXYGEN SATURATION: 96 %

## 2022-06-02 DIAGNOSIS — E11.9 CONTROLLED TYPE 2 DIABETES MELLITUS WITHOUT COMPLICATION, WITHOUT LONG-TERM CURRENT USE OF INSULIN (HCC): ICD-10-CM

## 2022-06-02 DIAGNOSIS — G89.29 CHRONIC PAIN OF LEFT KNEE: Primary | ICD-10-CM

## 2022-06-02 DIAGNOSIS — M25.562 CHRONIC PAIN OF LEFT KNEE: Primary | ICD-10-CM

## 2022-06-02 LAB
ESTIMATED AVERAGE GLUCOSE: 163 MG/DL
HBA1C MFR BLD: 7.3 % (ref 4–6)

## 2022-06-02 PROCEDURE — 99213 OFFICE O/P EST LOW 20 MIN: CPT | Performed by: NURSE PRACTITIONER

## 2022-06-02 PROCEDURE — 3051F HG A1C>EQUAL 7.0%<8.0%: CPT | Performed by: NURSE PRACTITIONER

## 2022-06-02 ASSESSMENT — ANXIETY QUESTIONNAIRES
3. WORRYING TOO MUCH ABOUT DIFFERENT THINGS: 0
IF YOU CHECKED OFF ANY PROBLEMS ON THIS QUESTIONNAIRE, HOW DIFFICULT HAVE THESE PROBLEMS MADE IT FOR YOU TO DO YOUR WORK, TAKE CARE OF THINGS AT HOME, OR GET ALONG WITH OTHER PEOPLE: NOT DIFFICULT AT ALL
2. NOT BEING ABLE TO STOP OR CONTROL WORRYING: 1
GAD7 TOTAL SCORE: 1
6. BECOMING EASILY ANNOYED OR IRRITABLE: 0
5. BEING SO RESTLESS THAT IT IS HARD TO SIT STILL: 0
1. FEELING NERVOUS, ANXIOUS, OR ON EDGE: 0
4. TROUBLE RELAXING: 0
7. FEELING AFRAID AS IF SOMETHING AWFUL MIGHT HAPPEN: 0

## 2022-06-02 ASSESSMENT — PATIENT HEALTH QUESTIONNAIRE - PHQ9
SUM OF ALL RESPONSES TO PHQ QUESTIONS 1-9: 2
SUM OF ALL RESPONSES TO PHQ QUESTIONS 1-9: 2
2. FEELING DOWN, DEPRESSED OR HOPELESS: 2
SUM OF ALL RESPONSES TO PHQ QUESTIONS 1-9: 2
1. LITTLE INTEREST OR PLEASURE IN DOING THINGS: 0
SUM OF ALL RESPONSES TO PHQ9 QUESTIONS 1 & 2: 2
SUM OF ALL RESPONSES TO PHQ QUESTIONS 1-9: 2

## 2022-06-02 NOTE — PROGRESS NOTES
Bob Rodríguez (:  1960) is a 59 y.o. male,Established patient, here for evaluation of the following chief complaint(s): Other (left knee pain-discuss)         ASSESSMENT/PLAN:  1. Chronic pain of left knee  2. Controlled type 2 diabetes mellitus without complication, without long-term current use of insulin (HCC)  -     Hemoglobin A1C; Future    Encouraged continued use of Voltaren  Encouraged to ice knee when needed  Encouraged to utilize compression brace with activity  Monitor for any increase in symptoms  If no improvement consider referral to orthopedics  Call office with any questions or concerns    Return if symptoms worsen or fail to improve. Subjective   SUBJECTIVE/OBJECTIVE:  Reporting left knee pain increasing in intensity and frequency. Has been using Voltaren twice per day and seems to be helping. No swelling. Reports pain is intermittent. Walking, going up steps makes it worse. Recommended ice and a brace for support. He has not interested in referral to orthopedics at this time. Review of Systems   Constitutional: Negative for activity change, appetite change, fatigue and fever. HENT: Negative for congestion, rhinorrhea and sore throat. Eyes: Negative for visual disturbance. Respiratory: Negative for cough and shortness of breath. Cardiovascular: Negative for chest pain and palpitations. Gastrointestinal: Negative for constipation, diarrhea, nausea and vomiting. Genitourinary: Negative for dysuria and urgency. Musculoskeletal: Positive for arthralgias (left knee pain-intermittent). Allergic/Immunologic: Negative for immunocompromised state. Neurological: Negative for syncope, light-headedness and headaches. Psychiatric/Behavioral: Negative for decreased concentration, dysphoric mood, sleep disturbance and suicidal ideas. The patient is not nervous/anxious. Objective   Physical Exam  Vitals and nursing note reviewed.    Constitutional: General: He is not in acute distress. Appearance: He is not ill-appearing. HENT:      Head: Normocephalic. Nose: Nose normal.      Mouth/Throat:      Lips: Pink. Pulmonary:      Effort: Pulmonary effort is normal. No respiratory distress. Neurological:      Mental Status: He is alert and oriented to person, place, and time. Psychiatric:         Attention and Perception: Attention normal.         Speech: Speech normal.         Behavior: Behavior is cooperative. An electronic signature was used to authenticate this note.     --Venancio Severs, APRN - NP

## 2022-06-19 ASSESSMENT — ENCOUNTER SYMPTOMS
VOMITING: 0
SHORTNESS OF BREATH: 0
SORE THROAT: 0
CONSTIPATION: 0
RHINORRHEA: 0
NAUSEA: 0
DIARRHEA: 0
COUGH: 0

## 2022-07-13 NOTE — TELEPHONE ENCOUNTER
Last script sent:06/02/2022  Last OV: 04/06/2022  Next Visit Date:  Future Appointments   Date Time Provider Kyaw Cramer   12/2/2022  7:15 AM JAMES Cortez NP [address] Maintenance   Topic Date Due    Pneumococcal 0-64 years Vaccine (2 - PCV) 04/08/2017    COVID-19 Vaccine (3 - Booster for Pfizer series) 10/08/2021    Diabetic foot exam  06/22/2022    Diabetic microalbuminuria test  06/22/2022    Shingles vaccine (1 of 2) 12/15/2022 (Originally 7/24/2008)    Diabetic retinal exam  12/25/2022 (Originally 5/22/2020)    Flu vaccine (1) 09/01/2022    Lipids  12/15/2022    Prostate Specific Antigen (PSA) Screening or Monitoring  12/15/2022    Colorectal Cancer Screen  01/10/2023    A1C test (Diabetic or Prediabetic)  06/02/2023    Depression Screen  06/02/2023    DTaP/Tdap/Td vaccine (2 - Td or Tdap) 07/10/2023    Hepatitis C screen  Completed    HIV screen  Completed    Hepatitis A vaccine  Aged Out    Hib vaccine  Aged Out    Meningococcal (ACWY) vaccine  Aged Out       Hemoglobin A1C (%)   Date Value   06/02/2022 7.3 (H)   12/15/2021 7.5 (H)   09/15/2021 7.5 (H)             ( goal A1C is < 7)   Microalb/Crt.  Ratio (mcg/mg creat)   Date Value   06/22/2021 CANNOT BE CALCULATED     LDL Cholesterol (mg/dL)   Date Value   12/15/2021 90       (goal LDL is <100)   AST (U/L)   Date Value   12/15/2021 28     ALT (U/L)   Date Value   12/15/2021 38     BUN (mg/dL)   Date Value   12/15/2021 13     BP Readings from Last 3 Encounters:   06/02/22 128/80   12/15/21 132/88   06/22/21 (!) 154/97          (goal 120/80)    All Future Testing planned in [facility]  Lab Frequency Next Occurrence         Patient Active Problem List:     Essential hypertension     Hyperlipidemia     Elevated liver enzymes     BPH (benign prostatic hyperplasia)     Diabetic macular edema (HCC)     Diabetes mellitus type II, controlled (HCC)     Elevated PSA     Gastroesophageal reflux disease     Pure

## 2022-07-14 RX ORDER — EMPAGLIFLOZIN, LINAGLIPTIN, METFORMIN HYDROCHLORIDE 5; 2.5; 1 MG/1; MG/1; MG/1
1 TABLET, EXTENDED RELEASE ORAL 2 TIMES DAILY WITH MEALS
Qty: 180 TABLET | Refills: 1 | Status: SHIPPED | OUTPATIENT
Start: 2022-07-14

## 2022-10-17 DIAGNOSIS — I10 ESSENTIAL HYPERTENSION: ICD-10-CM

## 2022-10-17 DIAGNOSIS — E78.00 PURE HYPERCHOLESTEROLEMIA: ICD-10-CM

## 2022-10-17 DIAGNOSIS — E11.9 CONTROLLED TYPE 2 DIABETES MELLITUS WITHOUT COMPLICATION, WITHOUT LONG-TERM CURRENT USE OF INSULIN (HCC): Primary | ICD-10-CM

## 2022-10-18 RX ORDER — LANCETS 33 GAUGE
EACH MISCELLANEOUS
Qty: 100 EACH | Refills: 3 | Status: SHIPPED | OUTPATIENT
Start: 2022-10-18

## 2022-10-18 RX ORDER — BLOOD SUGAR DIAGNOSTIC
STRIP MISCELLANEOUS
Qty: 100 STRIP | Refills: 3 | Status: SHIPPED | OUTPATIENT
Start: 2022-10-18

## 2022-10-18 RX ORDER — ASPIRIN 81 MG/1
TABLET, COATED ORAL
Qty: 90 TABLET | Refills: 1 | Status: SHIPPED | OUTPATIENT
Start: 2022-10-18

## 2022-10-18 RX ORDER — LISINOPRIL AND HYDROCHLOROTHIAZIDE 25; 20 MG/1; MG/1
1 TABLET ORAL DAILY
Qty: 90 TABLET | Refills: 1 | Status: SHIPPED | OUTPATIENT
Start: 2022-10-18

## 2022-10-18 RX ORDER — SIMVASTATIN 20 MG
TABLET ORAL
Qty: 90 TABLET | Refills: 1 | Status: SHIPPED | OUTPATIENT
Start: 2022-10-18

## 2022-10-18 NOTE — TELEPHONE ENCOUNTER
LOV 6/2/22  LRF ASA, Lisinopril, Simvastatin 4/5/22, Glucose Supplies 8/9/22  RTO     Health Maintenance   Topic Date Due    COVID-19 Vaccine (3 - Booster for Pfizer series) 10/08/2021    Diabetic foot exam  06/22/2022    Diabetic microalbuminuria test  06/22/2022    Flu vaccine (1) 08/01/2022    Shingles vaccine (1 of 2) 12/15/2022 (Originally 7/24/2008)    Diabetic retinal exam  12/25/2022 (Originally 5/22/2020)    Lipids  12/15/2022    Prostate Specific Antigen (PSA) Screening or Monitoring  12/15/2022    Colorectal Cancer Screen  01/10/2023    A1C test (Diabetic or Prediabetic)  06/02/2023    Depression Screen  06/02/2023    DTaP/Tdap/Td vaccine (2 - Td or Tdap) 07/10/2023    Pneumococcal 0-64 years Vaccine  Completed    Hepatitis C screen  Completed    HIV screen  Completed    Hepatitis A vaccine  Aged Out    Hib vaccine  Aged Out    Meningococcal (ACWY) vaccine  Aged Out             (applicable per patient's age: Cancer Screenings, Depression Screening, Fall Risk Screening, Immunizations)    Hemoglobin A1C (%)   Date Value   06/02/2022 7.3 (H)   12/15/2021 7.5 (H)   09/15/2021 7.5 (H)     Microalb/Crt.  Ratio (mcg/mg creat)   Date Value   06/22/2021 CANNOT BE CALCULATED     LDL Cholesterol (mg/dL)   Date Value   12/15/2021 90     AST (U/L)   Date Value   12/15/2021 28     ALT (U/L)   Date Value   12/15/2021 38     BUN (mg/dL)   Date Value   12/15/2021 13      (goal A1C is < 7)   (goal LDL is <100) need 30-50% reduction from baseline     BP Readings from Last 3 Encounters:   06/02/22 128/80   12/15/21 132/88   06/22/21 (!) 154/97    (goal /80)      All Future Testing planned in [facility]:  Lab Frequency Next Occurrence       Next Visit Date:  Future Appointments   Date Time Provider Kyaw Cramer   12/16/2022  7:15 AM Katarzyna Malik APRN - [facility] [place]            Patient Active Problem List:     Essential hypertension     Hyperlipidemia     Elevated liver enzymes     BPH (benign prostatic hyperplasia)     Diabetic macular edema (HCC)     Diabetes mellitus type II, controlled ([address]     Elevated PSA     Gastroesophageal reflux disease     Pure hypercholesterolemia

## 2023-01-10 ENCOUNTER — TELEPHONE (OUTPATIENT)
Dept: PHARMACY | Facility: CLINIC | Age: 63
End: 2023-01-10

## 2023-01-10 NOTE — LETTER
[facility]  [facility]  [address] [place] [address]        Valerio Navarro   [address]  [place] [address]         01/19/23     Dear Valerio Navarro,      Congratulations! You have completed the 2022 requirements for the [facility] Be Well With Diabetes Program. You have been automatically re-enrolled into the [facility] Be Well With Diabetes Program for 2023.    One of the requirements to participate in the [facility] Be Well With Diabetes Program is to complete a Clinical Pharmacist Telephone appointment yearly.   The [facility] Team has attempted to contact you to schedule your 2023 Diabetes Management telephone appointment but was unable to reach you.     We would like to work with you and your doctor to:  - Review your medications, including over-the-counter and herbal medications  - Answer questions about your medications and how to get the most benefit from them  - Identify potential drug interactions or side effects and help fix them  - Identify preferred medications that are equally effective, but available at a lower cost to you  - Help you reach the necessary requirements to remain enrolled in the Diabetes Management Program offered by [facility]     Please call toll free [phone], option #3 to schedule this appointment to take advantage of this service. Telephone appointments are available Monday thru Friday from 7:30 AM till 5:30 PM.     This is a courtesy reminder. If you have this appointment already scheduled for your 2023 enrollment in the program, please disregard this message. If you have not scheduled this appointment yet, please contact us at the above number to schedule.     Sincerely,      [facility]  Phone: toll free [phone], option #3

## 2023-01-10 NOTE — TELEPHONE ENCOUNTER
2023 Annual Pharmacist Visit  **Patient is [facility]**    Called patient to schedule 2023 yearly pharmacist appointment to discuss medications for Diabetes Management Program.    No answer. Left VM on TAD: Please call back at [phone] Option #3.      [facility]   Department, toll free: [phone] Option #3

## 2023-01-19 NOTE — TELEPHONE ENCOUNTER
Second attempt made to contact patient to schedule 2023 yearly pharmacist appointment to discuss medications for Diabetes Management Program.    No answer. Left VM on TAD: Please call back at [phone] Option #3.      Letter mailed to patient    [facility]   Department, toll free: [phone] Option #3      For Pharmacy Admin Tracking Only    Program: [address]:  No  Gap Closed?: No   Time Spent (min): 15

## 2023-01-23 NOTE — TELEPHONE ENCOUNTER
Last visit: 6/2/22  Last Med refill: 7/14/22  Does patient have enough medication for 72 hours:     Next Visit Date:  Future Appointments   Date Time Provider Kyaw Cramer   2/21/2023 10:45 AM JAMES Denton NP [facility] [address] Maintenance   Topic Date Due    Shingles vaccine (1 of 2) Never done    Diabetic retinal exam  05/22/2020    COVID-19 Vaccine (3 - Booster for Pfizer series) 07/03/2021    Diabetic foot exam  06/22/2022    Diabetic Alb to Cr ratio (uACR) test  06/22/2022    Flu vaccine (1) 08/01/2022    Lipids  12/15/2022    GFR test (Diabetes, CKD 3-4, OR last GFR 15-59)  12/15/2022    Prostate Specific Antigen (PSA) Screening or Monitoring  12/15/2022    Colorectal Cancer Screen  01/10/2023    A1C test (Diabetic or Prediabetic)  06/02/2023    Depression Screen  06/02/2023    DTaP/Tdap/Td vaccine (2 - Td or Tdap) 07/10/2023    Pneumococcal 0-64 years Vaccine  Completed    Hepatitis C screen  Completed    HIV screen  Completed    Hepatitis A vaccine  Aged Out    Hib vaccine  Aged Out    Meningococcal (ACWY) vaccine  Aged Out       Hemoglobin A1C (%)   Date Value   06/02/2022 7.3 (H)   12/15/2021 7.5 (H)   09/15/2021 7.5 (H)             ( goal A1C is < 7)   Microalb/Crt.  Ratio (mcg/mg creat)   Date Value   06/22/2021 CANNOT BE CALCULATED     LDL Cholesterol (mg/dL)   Date Value   12/15/2021 90   09/15/2021 75       (goal LDL is <100)   AST (U/L)   Date Value   12/15/2021 28     ALT (U/L)   Date Value   12/15/2021 38     BUN (mg/dL)   Date Value   12/15/2021 13     BP Readings from Last 3 Encounters:   06/02/22 128/80   12/15/21 132/88   06/22/21 (!) 154/97          (goal 120/80)    All Future Testing planned in [facility]  Lab Frequency Next Occurrence               Patient Active Problem List:     Essential hypertension     Hyperlipidemia     Elevated liver enzymes     BPH (benign prostatic hyperplasia)     Diabetic macular edema (HCC)     Diabetes mellitus type II, controlled ([address]     Elevated PSA     Gastroesophageal reflux disease     Pure hypercholesterolemia

## 2023-01-24 RX ORDER — EMPAGLIFLOZIN, LINAGLIPTIN, METFORMIN HYDROCHLORIDE 5; 2.5; 1 MG/1; MG/1; MG/1
TABLET, EXTENDED RELEASE ORAL
Qty: 180 TABLET | Refills: 1 | Status: SHIPPED | OUTPATIENT
Start: 2023-01-24

## 2023-02-14 NOTE — TELEPHONE ENCOUNTER
[facility] CLINICAL PHARMACY REVIEW - Be Well with Diabetes    Caryle Bayley is a 57 y.o. male enrolled in the [employer] Diabetes Program. Patient provided [address] with verbal consent to remain in the program for this year. Patient enrolled 2018. Insurance through the following employer: [employer]    Medications:  Current Outpatient Medications   Medication Instructions    AgaMatrix Ultra-Thin Lancets MISC USE TO TEST ONCE DAILY    ASPIRIN LOW DOSE 81 MG EC tablet TAKE 1 TABLET BY MOUTH ONE TIME A DAY    Blood Glucose Monitoring Suppl (PRODIGY AUTOCODE BLOOD GLUCOSE) w/Device KIT Use to test once daily and as needed as directed by provider    blood glucose test strips (PRODIGY NO CODING BLOOD GLUC) strip TEST ONE TIME DAILY AS NEEDED    lisinopril-hydroCHLOROthiazide (PRINZIDE;ZESTORETIC) 20-25 MG per tablet 1 tablet, Oral, DAILY    multivitamin (THERAGRAN) per tablet 1 tablet, DAILY    omeprazole (PRILOSEC) 20 mg, Oral, DAILY PRN - states it does help him PRN (only takes when he knows he will eat acidic food) - also mentioned that patient may want to try Pecid and/or Tums PRN (may be cheaper and work better PRN than a PPI). simvastatin (ZOCOR) 20 MG tablet TAKE ONE TABLET BY MOUTH NIGHTLY    sitaGLIPtan-metFORMIN (JANUMET)  MG per tablet 1 tablet, Oral, 2 TIMES DAILY WITH MEALS - not taking    TRIJARDY XR 5-2.5-1000 MG TB24  Metformin-Empagliflozin-Linagliptin   1000/5/2.5 mg  TAKE 1 TABLET BY MOUTH 2 TIMES A DAY WITH MEALS   Reminded patient that he will need a new rx for refills of aspirin, simvastatin, and lisinopril/hctz - all 3 were last refilled on 1/24/23 - has provider visit on 2/21 - patient stated that he prefers to tell provider when it closer to his refill time. Other OTC/Herbals: none    Current Pharmacy: [facility]    Current testing supplies/frequency: [employer] - refilled test strips in January.   Reminded that [employer] test strips will  90 days (3 months) after opening (and should not be used after that date). Write the date on the vial that you opened it to help you remember. Does use Agamatrix lancets. Pen needles/syringes: n/a      Allergies: Allergies   Allergen Reactions    Chocolate Other (See Comments)     headaches        Vitals/Labs:  Lab Results   Component Value Date    LABA1C 7.3 (H) 2022    LABA1C 7.5 (H) 12/15/2021    LABA1C 7.5 (H) 09/15/2021     BP Readings from Last 3 Encounters:   22 128/80   12/15/21 132/88   21 (!) 154/97     Lab Results   Component Value Date    CHOL 132 09/15/2021    TRIG 65 09/15/2021    HDL 48 12/15/2021    LDLCHOLESTEROL 90 12/15/2021     ALT   Date Value Ref Range Status   12/15/2021 38 5 - 41 U/L Final     AST   Date Value Ref Range Status   12/15/2021 28 <40 U/L Final     The 10-year ASCVD risk score (Deanna HANCOCK, et al., 2019) is: 21%    Values used to calculate the score:      Age: 57 years      Sex: Male      Is Non- : No      Diabetic: Yes      Tobacco smoker: No      Systolic Blood Pressure: 939 mmHg      Is BP treated: Yes      HDL Cholesterol: 48 mg/dL      Total Cholesterol: 151 mg/dL     Lab Results   Component Value Date    LABMICR CANNOT BE CALCULATED 2021     Component Ref Range & Units 21 1022 20 0947 12/3/19 0720 18 1220 10/20/17 0938 10/12/16 0938 10/8/15 0730   Microalb, Ur <21 mg/L <12  <12  <12  44 High   <12  <12 R, CM  <12    Creatinine, Ur 39.0 - 259.0 mg/dL 111.4  108.3  131.4  401.9 High   146.3  128. 5  141.3 R    Microalb/Crt. Ratio <17 mcg/mg creat CANNOT BE CALCULATED  CANNOT BE CALCULATED  CANNOT BE CALCULATED  11  8 CM  9 CM  8 R    Urine Microalbumin Interp             Lab Results   Component Value Date    CREATININE 0.78 12/15/2021     CrCl cannot be calculated (Patient's most recent lab result is older than the maximum 180 days allowed. ).     Lab Results   Component Value Date/Time    LABGLOM >60 12/15/2021 10:00 AM    LABGLOM >60 12/15/2020 10:39 AM    LABGLOM >60 2020 09:40 AM    LABGLOM >60 2019 07:40 AM       Immunizations:  Immunization History   Administered Date(s) Administered    COVID-19, PFIZER PURPLE top, DILUTE for use, (age 15 y+), 30mcg/0.3mL 2021, 2021    Influenza Virus Vaccine 10/17/2017, 10/17/2017, 10/26/2018, 2018, 10/25/2019, 10/02/2020, 10/11/2021    Pneumococcal Polysaccharide ([id number]) 2016    Tdap (Boostrix, Adacel) 07/10/2013        Social History:  Social History     Tobacco Use    Smoking status: Former     Packs/day: 1.00     Years: 20.00     Pack years: 20.00     Types: Cigarettes     Quit date: 2002     Years since quittin.4    Smokeless tobacco: Never    Tobacco comments:     single, 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   Substance Use Topics    Alcohol use: No     Alcohol/week: 0.0 standard drinks     Comment: used to be heavy drinker, quit 20 yrs ago       ASSESSMENT:  Initial Program Requirements (Y indicates has completed for the year, N indicates needs to be completed by 2023): No - Provider Visit for DM (1st)  No- A1c (1st)    Ongoing Program Requirements (Y indicates has completed for the year, N indicates needs to be completed by 2023):   No - Provider Visit for DM (2nd)  No - ACC/diabetes educator visit (if A1c over 8%)  No - A1c (2nd)  No - Lipid panel  No - Urine microalbumin  No - Pneumococcal vaccination: Hawa Lena; may consider; waiting until turns 69 yo this year  Pneumococcal Polysaccharide ([id number]) 2016   No - Influenza vaccination for 2023  No - Medication adherence over 70%  Yes - On statin or contraindication(s) - on simvastatin  Yes - On ACEi/ARB or contraindication(s) - on lisinopril/HCTZ     Formulary Medication Review:  Non-formulary or medications with cost-effective alternatives: n/a     Current medications eligible for copay waiver, up to $600, through [address] [place]:  - aspirin (with prescription), lisinopril/hydrochlorothiazide, simvastatin, Trijardy XR  - [employer] supplies     Diabetes Care:   - Glycemic Goal: <7.0%. Is not at blood glucose goal which may be related to changes in diet. Recommend extra attention to diet. and which may be related to changes in physical activity. Recommend resuming physical activity. Type 2 DM under fair control as evidenced by:  Hemoglobin A1C   Date Value Ref Range Status   06/02/2022 7.3 (H) 4.0 - 6.0 % Final     - Current symptoms/problems include none  - Home blood sugar records:  - checks fasting BG; it has been running around 200; discussed fasting BG goal  - Any episodes of hypoglycemia? No; discussed management options for low BG    - Known diabetic complications: none  - Eye exam current (within one year): yes  - Foot exam current (within one year): yes    - Therapy Optimization: Patient states that he had trouble getting an appointment in second half of 2022; however, he does have an appointment to discuss diabetes management next week. Based on next A1c, could increase Trijardy XR dose strength to Metformin-Empagliflozin-Linagliptin 1000/12.5/2.5 mg - this would increase the patient's empagliflozin dosing from 10 mg daily to 25 mg daily - if given twice daily (as the patient is currently taking), this would put the patient at the max daily dose of Trijardy XR (empagliflozin 25 mg/linagliptin 5 mg/metformin 2 g). - Medication changes since last A1c: n/a  - Medications/Classes already tried/failed: Janumet was changed to Trijardy  - Cardiovascular disease medication considerations: consider increase in statin to high intensity  - Daily aspirin?  Yes    - Medication compliance: compliant most of the time - sometimes has trouble remembering to take simvastatin at night; discussed use of a phone alarm; also discussed that higher intensity statins could be taken in the morning  - Diet compliance: compliant most of the time - states he likes to eat a lot of fruit - recommended to try to eat more berries (lowest in sugar)   - Current exercise: states it his hard to exercise after working all day; reviewed current ADA recommendations for exercise; states he is walking at work all day    Other Considerations:  - Blood Pressure Goal: BP less than 130/80 mmHg due to history of DM: Is at blood pressure goal.   - Lipids: Patient is prescribed moderate-intensity statin therapy. Could increase statin to high intensity (patient also has HTN as an ASCVD risk factor)  - Smoking status: quit > 20 years ago    PLAN:  - Consideration(s) for provider:   Will route a note to provider to consider a change to a high intensity statin regimen (i.e., atorvastatin 40-80 mg or rosuvastatin 20-40 mg), as patient is at higher cardiovascular risk due to one or more atherosclerotic cardiovascular disease risk factors (current ADA guidelines recommend high intensity statin therapy to reduce LDL cholesterol by >/= 50% of baseline and to target a LDL cholesterol goal of <70 mg/dL). Future considerations: Consider increase in Trijardy XR dose strength to 1000/12.5/2.5 mg if needed after next A1c review.     - DM program gaps identified:   Initial requirements: Provider Visit for DM (1st) and A1c (1st)     Ongoing requirements: Provider visit for DM (2nd), ACC/diabetes educator visit (if A1c over 8%), A1c (2nd), Lipid panel, Urine microalbumin, Pneumococcal vaccination: [id number], Influenza vaccination for [id number], and Medication adherence over 70%     - Education to patient: Discussed general issues about diabetes pathophysiology and management., Addressed diet and exercise, Discussed foot care, Reminded to get yearly retinal exam, Addressed medication adherence, Overview of Be Well With Diabetes program, Overview of HHP, Benefit/indication for statin in patients with diabetes, and Benefit/indication for pneumonia vaccine in patients with diabetes     - Follow up: PCP for identified gaps or as scheduled below    - Upcoming appointments:   Future Appointments   Date Time Provider Kyaw Cramer   2/21/2023 10:45 AM JAMES Sahu  [address], PharmD, [address] [place] [address], toll free: [phone], option 3    For Pharmacy Admin Tracking Only  Program: [address] in place:  No  Recommendation Provided To: Provider: 1 via Note to Provider  Intervention Detail: Dose Adjustment: 1, reason: Therapy Optimization  Intervention Accepted By: Provider: 1  Gap Closed?: Yes   Time Spent (min): 60

## 2023-02-15 ENCOUNTER — TELEPHONE (OUTPATIENT)
Dept: PHARMACY | Facility: CLINIC | Age: 63
End: 2023-02-15

## 2023-02-15 NOTE — TELEPHONE ENCOUNTER
JAMES Bolanos NP,    Your patient is currently enrolled in the Be Well with Diabetes program. After your patient's recent visit with a [facility] Clinical Pharmacist, the below were identified as opportunities to assist with their diabetes management (if patient is not eligible for below recommendations, please reply with reason/contraindication):   Recommend a change to a high intensity statin regimen (i.e., atorvastatin 40-80 mg or rosuvastatin 20-40 mg), as patient is at higher cardiovascular risk due to one or more atherosclerotic cardiovascular disease risk factors (current ADA guidelines recommend high intensity statin therapy to reduce LDL cholesterol by >/= 50% of baseline and to target a LDL cholesterol goal of <70 mg/dL). See pharmacist note dated 2/15/23 for complete details. To remain active in the program, the patient is to meet the requirements and documentation must be provided by pre-established deadlines (see below). Program Requirements  Initial Program Requirements (to be completed by 07/01/2023): Office visit with provider for DM (1st)  A1c (1st)    Ongoing Program Requirements (to be completed by 12/31/2023):   Office visit with provider for DM (2nd)  A1c (2nd)  Diabetes Education if A1c >8%  Lipid panel  Urine microalbumin   Pneumococcal vaccination (if applicable)  Influenza vaccination for Fall 2023  On statin or contraindication  On ACEi/ARB or contraindication    Thank you,  Willa Wheat, PharmD, [address] [place] [address], toll free: [phone], option 3

## 2023-02-21 ENCOUNTER — HOSPITAL ENCOUNTER (OUTPATIENT)
Age: 63
Setting detail: SPECIMEN
Discharge: HOME | End: 2023-02-21

## 2023-02-21 ENCOUNTER — OFFICE VISIT (OUTPATIENT)
Dept: FAMILY MEDICINE CLINIC | Age: 63
End: 2023-02-21
Payer: COMMERCIAL

## 2023-02-21 VITALS
HEIGHT: 71 IN | HEART RATE: 74 BPM | OXYGEN SATURATION: 97 % | BODY MASS INDEX: 37.1 KG/M2 | SYSTOLIC BLOOD PRESSURE: 132 MMHG | WEIGHT: 265 LBS | TEMPERATURE: 97.1 F | DIASTOLIC BLOOD PRESSURE: 76 MMHG

## 2023-02-21 DIAGNOSIS — E78.00 PURE HYPERCHOLESTEROLEMIA: ICD-10-CM

## 2023-02-21 DIAGNOSIS — M15.9 OSTEOARTHRITIS OF MULTIPLE JOINTS, UNSPECIFIED OSTEOARTHRITIS TYPE: ICD-10-CM

## 2023-02-21 DIAGNOSIS — E11.9 CONTROLLED TYPE 2 DIABETES MELLITUS WITHOUT COMPLICATION, WITHOUT LONG-TERM CURRENT USE OF INSULIN (HCC): ICD-10-CM

## 2023-02-21 DIAGNOSIS — K21.9 GASTROESOPHAGEAL REFLUX DISEASE, UNSPECIFIED WHETHER ESOPHAGITIS PRESENT: ICD-10-CM

## 2023-02-21 DIAGNOSIS — R97.20 ELEVATED PSA: ICD-10-CM

## 2023-02-21 DIAGNOSIS — Z00.00 ENCOUNTER FOR WELL ADULT EXAM WITHOUT ABNORMAL FINDINGS: Primary | ICD-10-CM

## 2023-02-21 DIAGNOSIS — I10 ESSENTIAL HYPERTENSION: ICD-10-CM

## 2023-02-21 DIAGNOSIS — E11.311 DIABETIC MACULAR EDEMA (HCC): ICD-10-CM

## 2023-02-21 LAB
ALBUMIN SERPL-MCNC: 4.5 G/DL (ref 3.5–5.2)
ALBUMIN/GLOBULIN RATIO: 1.6 (ref 1–2.5)
ALP SERPL-CCNC: 87 U/L (ref 40–129)
ALT SERPL-CCNC: 36 U/L (ref 5–41)
ANION GAP SERPL CALCULATED.3IONS-SCNC: 9 MMOL/L (ref 9–17)
AST SERPL-CCNC: 29 U/L
BILIRUB SERPL-MCNC: 0.7 MG/DL (ref 0.3–1.2)
BUN SERPL-MCNC: 20 MG/DL (ref 8–23)
CALCIUM SERPL-MCNC: 10 MG/DL (ref 8.6–10.4)
CHLORIDE SERPL-SCNC: 101 MMOL/L (ref 98–107)
CHOLEST SERPL-MCNC: 172 MG/DL
CHOLESTEROL/HDL RATIO: 3.7
CO2 SERPL-SCNC: 26 MMOL/L (ref 20–31)
CREAT SERPL-MCNC: 0.81 MG/DL (ref 0.7–1.2)
CREATININE URINE: 50.4 MG/DL (ref 39–259)
GFR SERPL CREATININE-BSD FRML MDRD: >60 ML/MIN/1.73M2
GLUCOSE SERPL-MCNC: 139 MG/DL (ref 70–99)
HCT VFR BLD AUTO: 49.2 % (ref 40.7–50.3)
HDLC SERPL-MCNC: 47 MG/DL
HGB BLD-MCNC: 16 G/DL (ref 13–17)
LDLC SERPL CALC-MCNC: 99 MG/DL (ref 0–130)
MCH RBC QN AUTO: 29.5 PG (ref 25.2–33.5)
MCHC RBC AUTO-ENTMCNC: 32.5 G/DL (ref 28.4–34.8)
MCV RBC AUTO: 90.8 FL (ref 82.6–102.9)
MICROALBUMIN/CREAT 24H UR: <12 MG/L
MICROALBUMIN/CREAT UR-RTO: NORMAL MCG/MG CREAT
NRBC AUTOMATED: 0 PER 100 WBC
PDW BLD-RTO: 13.5 % (ref 11.8–14.4)
PLATELET # BLD AUTO: 156 K/UL (ref 138–453)
PMV BLD AUTO: 10.2 FL (ref 8.1–13.5)
POTASSIUM SERPL-SCNC: 4.8 MMOL/L (ref 3.7–5.3)
PROT SERPL-MCNC: 7.4 G/DL (ref 6.4–8.3)
RBC # BLD: 5.42 M/UL (ref 4.21–5.77)
SODIUM SERPL-SCNC: 136 MMOL/L (ref 135–144)
TRIGL SERPL-MCNC: 132 MG/DL
WBC # BLD AUTO: 6.6 K/UL (ref 3.5–11.3)

## 2023-02-21 PROCEDURE — 3074F SYST BP LT 130 MM HG: CPT | Performed by: NURSE PRACTITIONER

## 2023-02-21 PROCEDURE — 99396 PREV VISIT EST AGE 40-64: CPT | Performed by: NURSE PRACTITIONER

## 2023-02-21 PROCEDURE — 3078F DIAST BP <80 MM HG: CPT | Performed by: NURSE PRACTITIONER

## 2023-02-21 RX ORDER — SEMAGLUTIDE 1.34 MG/ML
0.5 INJECTION, SOLUTION SUBCUTANEOUS WEEKLY
Qty: 1.5 ML | Refills: 3 | Status: SHIPPED | OUTPATIENT
Start: 2023-02-21 | End: 2024-02-21

## 2023-02-21 SDOH — ECONOMIC STABILITY: HOUSING INSECURITY
IN THE LAST 12 MONTHS, WAS THERE A TIME WHEN YOU DID NOT HAVE A STEADY PLACE TO SLEEP OR SLEPT IN A SHELTER (INCLUDING NOW)?: NO

## 2023-02-21 SDOH — ECONOMIC STABILITY: FOOD INSECURITY: WITHIN THE PAST 12 MONTHS, YOU WORRIED THAT YOUR FOOD WOULD RUN OUT BEFORE YOU GOT MONEY TO BUY MORE.: NEVER TRUE

## 2023-02-21 SDOH — ECONOMIC STABILITY: FOOD INSECURITY: WITHIN THE PAST 12 MONTHS, THE FOOD YOU BOUGHT JUST DIDN'T LAST AND YOU DIDN'T HAVE MONEY TO GET MORE.: NEVER TRUE

## 2023-02-21 SDOH — ECONOMIC STABILITY: INCOME INSECURITY: HOW HARD IS IT FOR YOU TO PAY FOR THE VERY BASICS LIKE FOOD, HOUSING, MEDICAL CARE, AND HEATING?: NOT HARD AT ALL

## 2023-02-21 ASSESSMENT — ANXIETY QUESTIONNAIRES
GAD7 TOTAL SCORE: 3
4. TROUBLE RELAXING: 0
7. FEELING AFRAID AS IF SOMETHING AWFUL MIGHT HAPPEN: 0
3. WORRYING TOO MUCH ABOUT DIFFERENT THINGS: 3
5. BEING SO RESTLESS THAT IT IS HARD TO SIT STILL: 0
6. BECOMING EASILY ANNOYED OR IRRITABLE: 0
1. FEELING NERVOUS, ANXIOUS, OR ON EDGE: 0
IF YOU CHECKED OFF ANY PROBLEMS ON THIS QUESTIONNAIRE, HOW DIFFICULT HAVE THESE PROBLEMS MADE IT FOR YOU TO DO YOUR WORK, TAKE CARE OF THINGS AT HOME, OR GET ALONG WITH OTHER PEOPLE: NOT DIFFICULT AT ALL
2. NOT BEING ABLE TO STOP OR CONTROL WORRYING: 0

## 2023-02-21 ASSESSMENT — PATIENT HEALTH QUESTIONNAIRE - PHQ9
SUM OF ALL RESPONSES TO PHQ QUESTIONS 1-9: 0
SUM OF ALL RESPONSES TO PHQ QUESTIONS 1-9: 0
SUM OF ALL RESPONSES TO PHQ9 QUESTIONS 1 & 2: 0
SUM OF ALL RESPONSES TO PHQ QUESTIONS 1-9: 0
2. FEELING DOWN, DEPRESSED OR HOPELESS: 0
SUM OF ALL RESPONSES TO PHQ QUESTIONS 1-9: 0
1. LITTLE INTEREST OR PLEASURE IN DOING THINGS: 0

## 2023-02-21 ASSESSMENT — ENCOUNTER SYMPTOMS
CONSTIPATION: 0
NAUSEA: 0
VOMITING: 0
ABDOMINAL DISTENTION: 0
RHINORRHEA: 0
COUGH: 0
SORE THROAT: 0
ABDOMINAL PAIN: 0
BACK PAIN: 0
DIARRHEA: 0
TROUBLE SWALLOWING: 0
WHEEZING: 0
EYE DISCHARGE: 0
EYE PAIN: 0
SHORTNESS OF BREATH: 0

## 2023-02-21 NOTE — PROGRESS NOTES
Well Adult Note  Name: Bryson Sue Date: 2023   MRN: [id number] Sex: Male   Age: 57 y.o. Ethnicity: Non- / Non    : 1960 Race: White (non-)      Christoph Adjutant is here for well adult exam.  History:  Aches and pains. Lower back bothering him. Taking aleve and using heating pad at night. Seems to be helping. Left cheek kind of achy. Thought was his glasses. Got a new pair. Was still bothering him. Wonders if because glucose is high. Does check glucose at home. 212 this morning. Is usually around 200. He was using bowflex at home. Did lose weight, but stopped using and weight went back up. Review of Systems   Constitutional:  Negative for activity change, appetite change and fever. HENT:  Negative for congestion, ear pain, rhinorrhea, sore throat and trouble swallowing. Eyes:  Negative for pain, discharge and visual disturbance. Wears glasses; Over due for eye exam   Respiratory:  Negative for cough, shortness of breath and wheezing. Cardiovascular:  Negative for chest pain. Gastrointestinal:  Negative for abdominal distention, abdominal pain, constipation, diarrhea, nausea and vomiting. GERD   Endocrine: Negative for polydipsia, polyphagia and polyuria. DM   Genitourinary:  Negative for difficulty urinating, dysuria, frequency and urgency. Followed up with Urology for elevated PSA.; has not been seen an about one year   Musculoskeletal:  Positive for arthralgias (hands, left knee) and joint swelling (hands). Negative for back pain and gait problem. Skin:  Negative for rash. Allergic/Immunologic: Positive for environmental allergies (seasonal). Neurological:  Negative for dizziness and light-headedness. Psychiatric/Behavioral:  Negative for dysphoric mood and sleep disturbance. The patient is not nervous/anxious and is not hyperactive.       Allergies   Allergen Reactions    Chocolate Other (See Comments)     headaches Prior to Visit Medications    Medication Sig Taking? Authorizing Provider   Semaglutide,0.25 or 0.5MG/DOS, (OZEMPIC, 0.25 OR 0.5 MG/DOSE,) 2 MG/1.5ML SOPN Inject 0.5 mg into the skin once a week 0.25mg weekly for 4 weeks, then increase to 0.5mg weekly Yes JAMES Frost NP   TRIJARDY XR 5-2.5-1000 MG TB24 TAKE 1 TABLET BY MOUTH 2 TIMES A DAY WITH MEALS Yes JAMES Frost NP   ASPIRIN LOW DOSE 81 MG EC tablet TAKE 1 TABLET BY MOUTH ONE TIME A DAY Yes JAMES Frost NP   AgaMatrix Ultra-Thin Lancets MISC USE TO TEST ONCE DAILY Yes JAMES Frost NP   simvastatin (ZOCOR) 20 MG tablet TAKE ONE TABLET BY MOUTH NIGHTLY Yes JAMES Frost NP   blood glucose test strips (PRODIGY NO CODING BLOOD GLUC) strip TEST ONE TIME DAILY AS NEEDED Yes JAMES Frost NP   lisinopril-hydroCHLOROthiazide (PRINZIDE;ZESTORETIC) 20-25 MG per tablet Take 1 tablet by mouth daily Yes JAMES Frost NP   omeprazole (PRILOSEC) 20 MG delayed release capsule Take 20 mg by mouth daily as needed (indigestion) Yes Historical Provider, MD   Blood Glucose Monitoring Suppl (PRODIGY AUTOCODE BLOOD GLUCOSE) w/Device KIT Use to test once daily and as needed as directed by provider Yes JAMES Herrera CNP   multivitamin [facility]) per tablet Take 1 tablet by mouth daily.    Yes Historical Provider, MD         Past Medical History:   Diagnosis Date    Allergic rhinitis     BPH (benign prostatic hyperplasia)     Diabetes mellitus ([facility] [address]     Hyperlipidemia     Hypertension     Type II or unspecified type diabetes mellitus without mention of complication, not stated as uncontrolled        Past Surgical History:   Procedure Laterality Date    COLONOSCOPY  2013    polypectomy bx adenomatous polyp    PROSTATE BIOPSY  Dec 2012, aug 2013    normal         Family History   Problem Relation Age of Onset    Heart Disease Mother         CAD s/p stents    Heart Disease Father         CAD,  at 72 of MI    High Blood Pressure Father        Social History     Tobacco Use    Smoking status: Former     Packs/day: 1.00     Years: 20.00     Pack years: 20.00     Types: Cigarettes     Quit date: 2002     Years since quittin.4    Smokeless tobacco: Never    Tobacco comments:     single, works at [address] [place] [address] in [employer]   Substance Use Topics    Alcohol use: No     Alcohol/week: 0.0 standard drinks     Comment: used to be heavy drinker, quit 20 yrs ago    Drug use: No       Objective   /76   Pulse 74   Temp 97.1 °F (36.2 °C)   Ht 5' 11\" (1.803 m)   Wt 265 lb (120.2 kg)   SpO2 97%   BMI 36.96 kg/m²   Wt Readings from Last 3 Encounters:   23 265 lb (120.2 kg)   23 265 lb (120.2 kg)   22 256 lb 12.8 oz (116.5 kg)     There were no vitals filed for this visit. Physical Exam  Vitals and nursing note reviewed. Constitutional:       General: He is not in acute distress. Appearance: He is not ill-appearing. HENT:      Head: Normocephalic. Nose: Nose normal.      Mouth/Throat:      Lips: Pink. Cardiovascular:      Rate and Rhythm: Regular rhythm. Pulses:           Carotid pulses are 2+ on the right side and 2+ on the left side. Radial pulses are 2+ on the right side and 2+ on the left side. Heart sounds: Normal heart sounds. No murmur heard. Pulmonary:      Effort: Pulmonary effort is normal. No respiratory distress. Breath sounds: No decreased breath sounds, wheezing or rhonchi. Musculoskeletal:      Right lower leg: No edema. Left lower leg: No edema. Neurological:      Mental Status: He is alert and oriented to person, place, and time. Psychiatric:         Attention and Perception: Attention normal.         Speech: Speech normal.         Behavior: Behavior is cooperative. Assessment   Plan   1. Encounter for well adult exam without abnormal findings  2. Pure hypercholesterolemia  -     Lipid Panel; Future  3. Essential hypertension  -     CBC; Future  -     Comprehensive Metabolic Panel; Future  4. Controlled type 2 diabetes mellitus without complication, without long-term current use of insulin (HCC)  -     Microalbumin, Ur; Future  -     Hemoglobin A1C; Future  -     CBC; Future  -     Comprehensive Metabolic Panel; Future  -     Semaglutide,0.25 or 0.5MG/DOS, (OZEMPIC, 0.25 OR 0.5 MG/DOSE,) 2 MG/1.5ML SOPN; Inject 0.5 mg into the skin once a week 0.25mg weekly for 4 weeks, then increase to 0.5mg weekly, Disp-1.5 mL, R-3Normal  5. Elevated PSA  6. Diabetic macular edema ([facility] [address]  7. Gastroesophageal reflux disease, unspecified whether esophagitis present  8.  Osteoarthritis of multiple joints, unspecified osteoarthritis type       Personalized Preventive Plan   Current Health Maintenance Status  Immunization History   Administered Date(s) Administered    COVID-19, PFIZER PURPLE top, DILUTE for use, (age 15 y+), 30mcg/0.3mL 04/16/2021, 05/08/2021    Influenza Virus Vaccine 10/17/2017, 10/17/2017, 10/26/2018, 11/06/2018, 10/25/2019, 10/02/2020, 10/11/2021    Influenza, Triv, 3 Years and older, IM (Afluria (5 yrs and older) 11/07/2022    Pneumococcal Polysaccharide ([id number]) 04/08/2016    Tdap (Boostrix, Adacel) 07/10/2013, 03/03/2023        Health Maintenance   Topic Date Due    Shingles vaccine (1 of 2) Never done    Diabetic retinal exam  10/18/2018    Diabetic foot exam  06/22/2022    Colorectal Cancer Screen  01/10/2023    COVID-19 Vaccine (3 - Booster for Pfizer series) 02/21/2024 (Originally 7/3/2021)    A1C test (Diabetic or Prediabetic)  05/21/2023    Diabetic Alb to Cr ratio (uACR) test  02/21/2024    Lipids  02/21/2024    Depression Screen  02/21/2024    GFR test (Diabetes, CKD 3-4, OR last GFR 15-59)  02/21/2024    Prostate Specific Antigen (PSA) Screening or Monitoring  02/21/2024    DTaP/Tdap/Td vaccine (3 - Td or Tdap) 03/03/2033    Flu vaccine  Completed    Pneumococcal 0-64 years Vaccine  Completed Hepatitis C screen  Completed    HIV screen  Completed    Hepatitis A vaccine  Aged Out    Hib vaccine  Aged Out    Meningococcal (ACWY) vaccine  Aged Out     Recommendations for [facility] Due: see orders and patient instructions/AVS.    Return in about 1 year (around 2/21/2024), or if symptoms worsen or fail to improve, for annual physical.

## 2023-02-21 NOTE — PATIENT INSTRUCTIONS
Starting a Weight Loss Plan: Care Instructions  Overview     If you're thinking about losing weight, it can be hard to know where to start. Your doctor can help you set up a weight loss plan that best meets your needs. You may want to take a class on nutrition or exercise, or you could join a weight loss support group. If you have questions about how to make changes to your eating or exercise habits, ask your doctor about seeing a registered dietitian or an exercise specialist.  It can be a big challenge to lose weight. But you don't have to make huge changes at once. Make small changes, and stick with them. When those changes become habit, add a few more changes. If you don't think you're ready to make changes right now, try to pick a date in the future. Make an appointment to see your doctor to discuss whether the time is right for you to start a plan. Follow-up care is a key part of your treatment and safety. Be sure to make and go to all appointments, and call your doctor if you are having problems. It's also a good idea to know your test results and keep a list of the medicines you take. How can you care for yourself at home? Set realistic goals. Many people expect to lose much more weight than is likely. A weight loss of 5% to 10% of your body weight may be enough to improve your health. Get family and friends involved to provide support. Talk to them about why you are trying to lose weight, and ask them to help. They can help by participating in exercise and having meals with you, even if they may be eating something different. Find what works best for you. If you do not have time or do not like to cook, a program that offers meal replacement bars or shakes may be better for you. Or if you like to prepare meals, finding a plan that includes daily menus and recipes may be best.  Ask your doctor about other health professionals who can help you achieve your weight loss goals.   A dietitian can help you make healthy changes in your diet. An exercise specialist or  can help you develop a safe and effective exercise program.  A counselor or psychiatrist can help you cope with issues such as depression, anxiety, or family problems that can make it hard to focus on weight loss. Consider joining a support group for people who are trying to lose weight. Your doctor can suggest groups in your area. Where can you learn more? Go to http://www.woods.com/ and enter U357 to learn more about \"Starting a Weight Loss Plan: Care Instructions. \"  Current as of: August 25, 2022               Content Version: 13.5  © 0572-7419 Healthwise, Incorporated. Care instructions adapted under license by [facility] ([facility]). If you have questions about a medical condition or this instruction, always ask your healthcare professional. [address] any warranty or liability for your use of this information.

## 2023-02-22 LAB
EST. AVERAGE GLUCOSE BLD GHB EST-MCNC: 214 MG/DL
HBA1C MFR BLD: 9.1 % (ref 4–6)
PROSTATE SPECIFIC ANTIGEN: 9.32 NG/ML

## 2023-03-01 DIAGNOSIS — Z12.11 COLON CANCER SCREENING: Primary | ICD-10-CM

## 2023-03-02 ENCOUNTER — TELEPHONE (OUTPATIENT)
Dept: GASTROENTEROLOGY | Age: 63
End: 2023-03-02

## 2023-03-03 ENCOUNTER — HOSPITAL ENCOUNTER (EMERGENCY)
Age: 63
Discharge: HOME | End: 2023-03-03
Attending: EMERGENCY MEDICINE
Payer: COMMERCIAL

## 2023-03-03 VITALS
SYSTOLIC BLOOD PRESSURE: 129 MMHG | HEART RATE: 83 BPM | OXYGEN SATURATION: 99 % | RESPIRATION RATE: 15 BRPM | TEMPERATURE: 98.1 F | DIASTOLIC BLOOD PRESSURE: 86 MMHG | WEIGHT: 265 LBS | BODY MASS INDEX: 37.1 KG/M2 | HEIGHT: 71 IN

## 2023-03-03 DIAGNOSIS — S01.01XA LACERATION OF SCALP, INITIAL ENCOUNTER: Primary | ICD-10-CM

## 2023-03-03 PROCEDURE — 6360000002 HC RX W HCPCS: Performed by: EMERGENCY MEDICINE

## 2023-03-03 PROCEDURE — 2500000003 HC RX 250 WO HCPCS: Performed by: EMERGENCY MEDICINE

## 2023-03-03 PROCEDURE — 12002 RPR S/N/AX/GEN/TRNK2.6-7.5CM: CPT

## 2023-03-03 PROCEDURE — 90715 TDAP VACCINE 7 YRS/> IM: CPT | Performed by: EMERGENCY MEDICINE

## 2023-03-03 PROCEDURE — 90471 IMMUNIZATION ADMIN: CPT | Performed by: EMERGENCY MEDICINE

## 2023-03-03 PROCEDURE — 99284 EMERGENCY DEPT VISIT MOD MDM: CPT

## 2023-03-03 RX ORDER — LIDOCAINE HYDROCHLORIDE 20 MG/ML
5 INJECTION, SOLUTION INFILTRATION; PERINEURAL ONCE
Status: COMPLETED | OUTPATIENT
Start: 2023-03-03 | End: 2023-03-03

## 2023-03-03 RX ADMIN — LIDOCAINE HYDROCHLORIDE 5 ML: 20 INJECTION, SOLUTION INFILTRATION; PERINEURAL at 09:39

## 2023-03-03 RX ADMIN — TETANUS TOXOID, REDUCED DIPHTHERIA TOXOID AND ACELLULAR PERTUSSIS VACCINE, ADSORBED 0.5 ML: 5; 2.5; 8; 8; 2.5 SUSPENSION INTRAMUSCULAR at 09:44

## 2023-03-03 ASSESSMENT — PAIN DESCRIPTION - DESCRIPTORS
DESCRIPTORS: THROBBING
DESCRIPTORS: THROBBING

## 2023-03-03 ASSESSMENT — PAIN - FUNCTIONAL ASSESSMENT: PAIN_FUNCTIONAL_ASSESSMENT: 0-10

## 2023-03-03 ASSESSMENT — PAIN DESCRIPTION - LOCATION
LOCATION: HEAD
LOCATION: HEAD

## 2023-03-03 ASSESSMENT — PAIN DESCRIPTION - ORIENTATION: ORIENTATION: POSTERIOR

## 2023-03-03 ASSESSMENT — PAIN SCALES - GENERAL
PAINLEVEL_OUTOF10: 3
PAINLEVEL_OUTOF10: 3

## 2023-03-03 ASSESSMENT — LIFESTYLE VARIABLES
HOW OFTEN DO YOU HAVE A DRINK CONTAINING ALCOHOL: NEVER
HOW MANY STANDARD DRINKS CONTAINING ALCOHOL DO YOU HAVE ON A TYPICAL DAY: PATIENT DOES NOT DRINK

## 2023-03-03 NOTE — ED PROVIDER NOTES
[address]  EMERGENCY DEPARTMENT ENCOUNTER      Pt Name: Radha Davis  MRN: [id number]  Erasmogfjerson 1960  Date of evaluation: 3/3/23    CHIEF COMPLAINT       Chief Complaint   Patient presents with    Head Laceration         HISTORY OF PRESENT ILLNESS   HPI 57 y.o. male presents with c/o scalp laceration. He states that he was at work, slipped, fell backwards and hit the back of his head against the wall. He sustained a laceration on his scalp. No loss of consciousness. No headache. He reports mild pain at the site. He is not on any anticoagulants and he denies any other injury. Injury happened just prior to presentation. REVIEW OF SYSTEMS       Review of Systems   Constitutional:  Negative for fever. Skin:  Positive for wound. Neurological:  Negative for headaches. Hematological:  Does not bruise/bleed easily.      PAST MEDICAL HISTORY     Past Medical History:   Diagnosis Date    Allergic rhinitis     BPH (benign prostatic hyperplasia)     Diabetes mellitus ([facility] [address]     Hyperlipidemia     Hypertension     Type II or unspecified type diabetes mellitus without mention of complication, not stated as uncontrolled        SURGICAL HISTORY       Past Surgical History:   Procedure Laterality Date    COLONOSCOPY  jan 2013    polypectomy bx adenomatous polyp    PROSTATE BIOPSY  Dec 2012, aug 2013    normal       CURRENT MEDICATIONS       Previous Medications    AGAMATRIX ULTRA-THIN LANCETS MISC    USE TO TEST ONCE DAILY    ASPIRIN LOW DOSE 81 MG EC TABLET    TAKE 1 TABLET BY MOUTH ONE TIME A DAY    BLOOD GLUCOSE MONITORING SUPPL (PRODIGY AUTOCODE BLOOD GLUCOSE) W/DEVICE KIT    Use to test once daily and as needed as directed by provider    BLOOD GLUCOSE TEST STRIPS (PRODIGY NO CODING BLOOD GLUC) STRIP    TEST ONE TIME DAILY AS NEEDED    LISINOPRIL-HYDROCHLOROTHIAZIDE (PRINZIDE;ZESTORETIC) 20-25 MG PER TABLET    Take 1 tablet by mouth daily    MULTIVITAMIN (THERAGRAN) PER TABLET    Take 1 tablet by mouth daily. OMEPRAZOLE (PRILOSEC) 20 MG DELAYED RELEASE CAPSULE    Take 20 mg by mouth daily as needed (indigestion)    SEMAGLUTIDE,0.25 OR 0.5MG/DOS, (OZEMPIC, 0.25 OR 0.5 MG/DOSE,) 2 MG/1.5ML SOPN    Inject 0.5 mg into the skin once a week 0.25mg weekly for 4 weeks, then increase to 0.5mg weekly    SIMVASTATIN (ZOCOR) 20 MG TABLET    TAKE ONE TABLET BY MOUTH NIGHTLY    TRIJARDY XR 5-2.5-1000 MG TB24    TAKE 1 TABLET BY MOUTH 2 TIMES A DAY WITH MEALS       ALLERGIES     is allergic to chocolate. FAMILY HISTORY     He indicated that his mother is alive. He indicated that his father is . SOCIAL HISTORY      reports that he quit smoking about 20 years ago. His smoking use included cigarettes. He has a 20.00 pack-year smoking history. He has never used smokeless tobacco. He reports that he does not drink alcohol and does not use drugs. PHYSICAL EXAM     INITIAL VITALS: /83   Pulse 83   Temp 98.1 °F (36.7 °C) (Oral)   Resp 15   Ht 5' 11\" (1.803 m)   Wt 265 lb (120.2 kg)   SpO2 97%   BMI 36.96 kg/m²     GEN: NAD  Head: 4cm laceration to the r. Occipital scalp  HEENT: PERRL. EOMI, No conjunctival hemorrhage. No pupil deformity. Negative bhardwaj sign, negative hemotympanum, negative csf rhinorrhea. Negative raccoon eyes. Neck: No subq emphysema. Cervical spine with no ttp. CVS: RRR, no murmurs, no rubs, no muffled heart sounds. Bilateral radial, dp, and pt pulses are 2+. Pulm: CTA b/l. Normal breath sounds over all lung fields. Neurologic: Patient is alert and oriented x3, motor and sensation is intact in all 4 extremities, speech is fluent    MEDICAL DECISION MAKING:     MDM    57 y.o. male presenting with head injury and scalp laceration. No LOC, vomiting, or significant HA, doubt ICH. Laceration repaired per procedure note. Tetanus up to date.   D/w pt treatment plan, warning precautions for prompt ED return and importance of close OP FU, he verbalizes understanding and agrees with the treatment plan.        #12 - Emergency Medicine: Utilization of CT for Minor Blunt Head Trauma (Adult)   [] Patient has one or more of the following conditions that are excluded from the measure (select all that apply):    [] Patient has ventricular shunt   [] Patient has brain tumor    [] Patient is pregnant   [] Patient has multi-system trauma    [] Patient taking an antiplatelet medication (excluding aspirin)   [x] Head CT not ordered by emergency care clinician   [] Head CT ordered for reasons other than trauma           DIAGNOSTIC RESULTS       EMERGENCY DEPARTMENT COURSE:   Vitals:    Vitals:    03/03/23 0821 03/03/23 0844 03/03/23 0851   BP: (!) 172/105  119/83   Pulse: 81 81 83   Resp: 16  15   Temp: 98.1 °F (36.7 °C)     TempSrc: Oral     SpO2: 97%  97%   Weight: 265 lb (120.2 kg)     Height: 5' 11\" (1.803 m)         The patient was given the following medications while in the emergency department:  Orders Placed This Encounter   Medications    lidocaine 2 % injection 5 mL    tetanus-diphth-acell pertussis (BOOSTRIX) injection 0.5 mL     -------------------------  CRITICAL CARE:   CONSULTS: None  PROCEDURES: Lac Repair    Date/Time: 3/3/2023 9:32 AM  Performed by: Jenae Canchola MD  Authorized by: Jenae Canchola MD     Consent:     Consent obtained:  Verbal    Consent given by:  Patient    Risks discussed:  Pain and poor wound healing  Universal protocol:     Patient identity confirmed:  Verbally with patient  Laceration details:     Location:  Scalp    Length (cm):  4    Depth (mm):  5  Exploration:     Hemostasis achieved with:  Direct pressure    Wound exploration: wound explored through full range of motion      Contaminated: no    Treatment:     Area cleansed with:  Povidone-iodine and saline    Amount of cleaning:  Standard    Irrigation solution:  Sterile saline    Irrigation volume:  1000ml    Irrigation method:  Syringe    Visualized foreign bodies/material removed: no      Debridement:  None    Undermining:  None    Scar revision: no    Skin repair:     Repair method:  Staples    Number of staples:  4  Approximation:     Approximation:  Close  Post-procedure details:     Dressing:  Antibiotic ointment    Procedure completion:  Tolerated well, no immediate complications     FINAL IMPRESSION      1.  Laceration of scalp, initial encounter          DISPOSITION/PLAN   DISPOSITION Decision To Discharge 03/03/2023 09:31:24 AM      PATIENT REFERRED TO:  JAMES Samuel - NP  [address] [place] [address] [phone]    In 1 week        DISCHARGE MEDICATIONS:  New Prescriptions    No medications on file         Vlad Farley MD  Attending Emergency Physician                      Vlad Farley MD  03/04/23 9521

## 2023-03-03 NOTE — ED NOTES
Laceration repair done by MD. Irrigation and wound cleansing done with 5 staples placed.      Tabatha Irving RN  03/03/23 3296

## 2023-03-06 ENCOUNTER — TELEPHONE (OUTPATIENT)
Dept: FAMILY MEDICINE CLINIC | Age: 63
End: 2023-03-06

## 2023-03-06 NOTE — TELEPHONE ENCOUNTER
Moisés Joyce, contacted the office today from [address] [place] [address] because the patient's [place] [employer] was denied for a duplicate therapy between [address] and Ozempic. Moisés Joyce stated that the Ozempic could be covered if the Trijardy is discontinued. Please call the pharmacy and let Moisés Joyce know so she can place the override.       [phone]

## 2023-03-07 ENCOUNTER — TELEPHONE (OUTPATIENT)
Dept: GASTROENTEROLOGY | Age: 63
End: 2023-03-07

## 2023-03-07 ENCOUNTER — TELEPHONE (OUTPATIENT)
Dept: FAMILY MEDICINE CLINIC | Age: 63
End: 2023-03-07

## 2023-03-07 DIAGNOSIS — E11.9 CONTROLLED TYPE 2 DIABETES MELLITUS WITHOUT COMPLICATION, WITHOUT LONG-TERM CURRENT USE OF INSULIN (HCC): Primary | ICD-10-CM

## 2023-03-07 RX ORDER — POLYETHYLENE GLYCOL 3350 17 G/17G
POWDER, FOR SOLUTION ORAL
Qty: 238 G | Refills: 0 | Status: SHIPPED | OUTPATIENT
Start: 2023-03-07

## 2023-03-07 RX ORDER — BISACODYL 5 MG
TABLET, DELAYED RELEASE (ENTERIC COATED) ORAL
Qty: 4 TABLET | Refills: 0 | Status: SHIPPED | OUTPATIENT
Start: 2023-03-07

## 2023-03-07 NOTE — TELEPHONE ENCOUNTER
Elizabeth Murguia, JAMES - NP  - Ozempic and DPP4 that is in the trijardy are a duplication of therapy and patient's insurance company will not pay for both. - recommended to change trijardy to [place] and the ozempic will then go through the insurance with no issues. - would recommend to increase jardiance dose and change to synjardy 12.5/1000 mg XR 1 tab BID, script pended    Thank you,  Ankit Stephen, PharmD, [address] [facility] Pharmacist  Department: [phone]  =========================================    POPULATION HEALTH CLINICAL PHARMACY REVIEW - BE WELL WITH DIABETES: HIGH A1C  =============================================  Claudia Crews is a 57 y.o. male enrolled in the [facility] Be Well with Diabetes program.    Identified care gap(s): A1c > 9%    DM Program Prescriptions:  Current Outpatient Medications   Medication Instructions    AgaMatrix Ultra-Thin Lancets MISC USE TO TEST ONCE DAILY    ASPIRIN LOW DOSE 81 MG EC tablet TAKE 1 TABLET BY MOUTH ONE TIME A DAY    Blood Glucose Monitoring Suppl (PRODIGY AUTOCODE BLOOD GLUCOSE) w/Device KIT Use to test once daily and as needed as directed by provider    blood glucose test strips (PRODIGY NO CODING BLOOD GLUC) strip TEST ONE TIME DAILY AS NEEDED    lisinopril-hydroCHLOROthiazide (PRINZIDE;ZESTORETIC) 20-25 MG per tablet 1 tablet, Oral, DAILY    multivitamin (THERAGRAN) per tablet 1 tablet, DAILY    omeprazole (PRILOSEC) 20 mg, Oral, DAILY PRN    Ozempic (0.25 or 0.5 MG/DOSE) 0.5 mg, SubCUTAneous, WEEKLY, 0.25mg weekly for 4 weeks, then increase to 0.5mg weekly    simvastatin (ZOCOR) 20 MG tablet TAKE ONE TABLET BY MOUTH NIGHTLY    TRIJARDY XR 5-2.5-1000 MG TB24 TAKE 1 TABLET BY MOUTH 2 TIMES A DAY WITH MEALS        Allergies:   Allergies   Allergen Reactions    Chocolate Other (See Comments)     headaches        Labs:  Lab Results   Component Value Date    LABA1C 9.1 (H) 02/21/2023    LABA1C 7.3 (H) 06/02/2022    LABA1C 7.5 (H) 12/15/2021     Estimated Creatinine Clearance: 122 mL/min (based on SCr of 0.81 mg/dL). Care Team:   Physician (PCP): NP on 2/25/23   - Upcoming appointments:   Future Appointments   Date Time Provider Kyaw Cramer   8/22/2023  8:15 AM Harrington Boxer, APRN - [facility]       Diabetes Care:  - Glycemic Goal: <7.0% and directed by provider. Is not at blood glucose goal ozmepic has been added. .   - Duplicate MOA: GLP-1 and DPP-4 -  ozempic rejecting at pharmacy now due to trijardy and ozempic together  - Therapy Optimization: lifestyle changes, change to synjardy with increased dose of jardiance and start ozempic  - Medication changes since last A1c: hoping to increase jardiance and start ozempic titration    Plan:    Reached patient for review. Patient states stress has increased due to 2 deaths in his family. His mom and uncle that he was both caring for in his home have passed away. He was stress eating. My condolences provided. Educated patient to remove sweets and unwanted carbs from the home and to not have these food available in the house. Educated patient on his insurance and ozempic and trijardy issue. Educated on new plan with synjardy and ozempic. Patient agreeable. Patient states he was told by prescriber to come to office when he gets ozmepic and they will teach him how to do the shot. Educated patient on this as well and he can call us for questions too. I will pend script for synjardy and discuss with [facility].      Angela Richard, PharmD, [address] Jer[facility] Kowalski Pharmacist  Department: [phone]    For Pharmacy Admin Tracking Only    Program: [address] in place:  No  Recommendation Provided To: Provider: 1 via Note to Provider, Patient/Caregiver: 1 via Telephone, and Pharmacy: 1  Intervention Detail: Discontinued Rx: 1, reason: Duplicate Therapy, Dose Adjustment: 1, reason: Therapy Optimization, and New Rx: 1, reason: Needs Additional Therapy  Intervention Accepted By: Provider: 1, Patient/Caregiver: 1, and Pharmacy: 1  Gap Closed?: Yes   Time Spent (min): 20

## 2023-03-07 NOTE — TELEPHONE ENCOUNTER
ED Follow up Call    Reason for ED visit:  Head laceration   Status:     improved    Did you call your PCP prior to going to the ED? No      Did you receive a discharge instructions from the Emergency Room? Yes  Review of Instructions:     Understands what to report/when to return?:  Yes   Understands discharge instructions?:  Yes   Following discharge instructions?:  Yes   If not why? Are there any new complaints of pain? No  New Pain Meds? No    Constipation prophylaxis needed? N/A    If you have a wound is the dressing clean, dry, and intact? N/A  Understands wound care regimen? N/A    Are there any other complaints/concerns that you wish to tell your provider? Patient received 5 staples, he is scheduled on Monday at [facility] for removal. Patient followed up at [facility] yesterday and the NP there looked at it and stated it looked good. Patient did not feel the need to schedule a follow up at this time. FU appts/Provider:    Future Appointments   Date Time Provider Kyaw Cramer   8/22/2023  8:15 AM JAMES Agustin - DOE [facility]           New Medications?:   Yes and No      Medication Reconciliation by phone - No  Understands Medications? No  Taking Medications? No  Can you swallow your pills? Not Applicable    Any further needs in the home i.e. Equipment?   Not Applicable    Link to services in community?:  N/A   Which services:

## 2023-03-07 NOTE — TELEPHONE ENCOUNTER
Colonoscopy(screening)/Solange Avina     3/29/23 at 8:30am [place]     Verbal instructions given via phone  Written mailed     Miralax/dulcolax     Screening questionnaire

## 2023-03-08 RX ORDER — EMPAGLIFLOZIN, METFORMIN HYDROCHLORIDE 12.5; 1 MG/1; MG/1
1 TABLET, EXTENDED RELEASE ORAL 2 TIMES DAILY WITH MEALS
Qty: 180 TABLET | Refills: 1 | Status: SHIPPED | OUTPATIENT
Start: 2023-03-08

## 2023-03-20 NOTE — DISCHARGE INSTRUCTIONS
ormal changes you may experience after a colonoscopy:  Passing of gas for several hours after  Some mild abdominal cramping  a biopsy/ polypectomy was done, you may see some spotting of blood on the tissue when wiping  You may feel fatigued for the next 24-48 hours due to the preparation, sedation and procedure    Activity   You have had anesthesia today  Do not drive, operate heavy equipment, consume alcoholic beverages, or make any important decisions  for 24 hours   Take your time changing positions today. You may feel light headed or dizzy if you move too quickly. Rest for the next 24 hours. Diet   You can eat your normal diet when you feel well. You should start off with bland foods like chicken soup, toast, or yogurt. Then advance as tolerated. Drink plenty of fluids (unless your doctor tells you not to). Your urine should be very lightly colored without a strong odor. Medicines   Continue your home medications as ordered by your physician.      Call your doctor now or seek immediate medical care if:   [phone]  You are passing blood rectally or vomiting blood (color of blood may be red or black)  Severe abdominal pain or tenderness (that is not relieved by passing air)   You have a fever, chills or excessive sweating   You have persistent nausea or vomiting   Redness or swelling at the IV site

## 2023-03-27 NOTE — PRE-PROCEDURE INSTRUCTIONS
PAT phone call completed with pt. Date/time/location (entrance C) of surgery/procedure verified with pt. NPO after MN status verified with pt. Need for  verified with pt. Verified need to complete bowel prep/instructions per Dr. Penn Press pt to take a shower the AM of surgery/procedure and to avoid lotions, creams, jewelry.

## 2023-03-28 ENCOUNTER — ANESTHESIA EVENT (OUTPATIENT)
Dept: OPERATING ROOM | Age: 63
End: 2023-03-28
Payer: COMMERCIAL

## 2023-03-28 ENCOUNTER — TELEPHONE (OUTPATIENT)
Dept: FAMILY MEDICINE CLINIC | Age: 63
End: 2023-03-28

## 2023-03-29 ENCOUNTER — HOSPITAL ENCOUNTER (OUTPATIENT)
Age: 63
Setting detail: OUTPATIENT SURGERY
Discharge: HOME | End: 2023-03-29
Attending: INTERNAL MEDICINE | Admitting: INTERNAL MEDICINE
Payer: COMMERCIAL

## 2023-03-29 ENCOUNTER — ANESTHESIA (OUTPATIENT)
Dept: OPERATING ROOM | Age: 63
End: 2023-03-29
Payer: COMMERCIAL

## 2023-03-29 VITALS
SYSTOLIC BLOOD PRESSURE: 110 MMHG | HEIGHT: 71 IN | HEART RATE: 77 BPM | TEMPERATURE: 97.4 F | RESPIRATION RATE: 18 BRPM | WEIGHT: 252 LBS | DIASTOLIC BLOOD PRESSURE: 72 MMHG | OXYGEN SATURATION: 98 % | BODY MASS INDEX: 35.28 KG/M2

## 2023-03-29 DIAGNOSIS — Z12.11 SCREEN FOR COLON CANCER: ICD-10-CM

## 2023-03-29 PROBLEM — K63.5 POLYP OF SIGMOID COLON: Status: ACTIVE | Noted: 2023-03-29

## 2023-03-29 PROBLEM — K62.1 RECTAL POLYP: Status: ACTIVE | Noted: 2023-03-29

## 2023-03-29 PROBLEM — K64.9 HEMORRHOIDS: Status: ACTIVE | Noted: 2023-03-29

## 2023-03-29 LAB — GLUCOSE BLD-MCNC: 170 MG/DL (ref 75–110)

## 2023-03-29 PROCEDURE — 6360000002 HC RX W HCPCS

## 2023-03-29 PROCEDURE — 82947 ASSAY GLUCOSE BLOOD QUANT: CPT

## 2023-03-29 PROCEDURE — 2500000003 HC RX 250 WO HCPCS

## 2023-03-29 PROCEDURE — 3700000001 HC ADD 15 MINUTES (ANESTHESIA): Performed by: INTERNAL MEDICINE

## 2023-03-29 PROCEDURE — 7100000010 HC PHASE II RECOVERY - FIRST 15 MIN: Performed by: INTERNAL MEDICINE

## 2023-03-29 PROCEDURE — 7100000000 HC PACU RECOVERY - FIRST 15 MIN: Performed by: INTERNAL MEDICINE

## 2023-03-29 PROCEDURE — 2709999900 HC NON-CHARGEABLE SUPPLY: Performed by: INTERNAL MEDICINE

## 2023-03-29 PROCEDURE — 7100000011 HC PHASE II RECOVERY - ADDTL 15 MIN: Performed by: INTERNAL MEDICINE

## 2023-03-29 PROCEDURE — 7100000001 HC PACU RECOVERY - ADDTL 15 MIN: Performed by: INTERNAL MEDICINE

## 2023-03-29 PROCEDURE — 88305 TISSUE EXAM BY PATHOLOGIST: CPT

## 2023-03-29 PROCEDURE — 2580000003 HC RX 258: Performed by: ANESTHESIOLOGY

## 2023-03-29 PROCEDURE — 3700000000 HC ANESTHESIA ATTENDED CARE: Performed by: INTERNAL MEDICINE

## 2023-03-29 PROCEDURE — 3609010400 HC COLONOSCOPY POLYPECTOMY HOT BIOPSY: Performed by: INTERNAL MEDICINE

## 2023-03-29 PROCEDURE — 45385 COLONOSCOPY W/LESION REMOVAL: CPT | Performed by: INTERNAL MEDICINE

## 2023-03-29 RX ORDER — ONDANSETRON 2 MG/ML
4 INJECTION INTRAMUSCULAR; INTRAVENOUS
Status: DISCONTINUED | OUTPATIENT
Start: 2023-03-29 | End: 2023-03-29 | Stop reason: HOSPADM

## 2023-03-29 RX ORDER — SODIUM CHLORIDE 9 MG/ML
25 INJECTION, SOLUTION INTRAVENOUS PRN
Status: DISCONTINUED | OUTPATIENT
Start: 2023-03-29 | End: 2023-03-29 | Stop reason: HOSPADM

## 2023-03-29 RX ORDER — METOCLOPRAMIDE HYDROCHLORIDE 5 MG/ML
10 INJECTION INTRAMUSCULAR; INTRAVENOUS
Status: DISCONTINUED | OUTPATIENT
Start: 2023-03-29 | End: 2023-03-29 | Stop reason: HOSPADM

## 2023-03-29 RX ORDER — SODIUM CHLORIDE 0.9 % (FLUSH) 0.9 %
5-40 SYRINGE (ML) INJECTION EVERY 12 HOURS SCHEDULED
Status: DISCONTINUED | OUTPATIENT
Start: 2023-03-29 | End: 2023-03-29 | Stop reason: HOSPADM

## 2023-03-29 RX ORDER — MIDAZOLAM HYDROCHLORIDE 2 MG/2ML
2 INJECTION, SOLUTION INTRAMUSCULAR; INTRAVENOUS
Status: DISCONTINUED | OUTPATIENT
Start: 2023-03-29 | End: 2023-03-29 | Stop reason: HOSPADM

## 2023-03-29 RX ORDER — SODIUM CHLORIDE 0.9 % (FLUSH) 0.9 %
5-40 SYRINGE (ML) INJECTION PRN
Status: DISCONTINUED | OUTPATIENT
Start: 2023-03-29 | End: 2023-03-29 | Stop reason: HOSPADM

## 2023-03-29 RX ORDER — SODIUM CHLORIDE 9 MG/ML
INJECTION, SOLUTION INTRAVENOUS PRN
Status: DISCONTINUED | OUTPATIENT
Start: 2023-03-29 | End: 2023-03-29 | Stop reason: HOSPADM

## 2023-03-29 RX ORDER — PROMETHAZINE HYDROCHLORIDE 25 MG/ML
6.25 INJECTION, SOLUTION INTRAMUSCULAR; INTRAVENOUS EVERY 5 MIN PRN
Status: DISCONTINUED | OUTPATIENT
Start: 2023-03-29 | End: 2023-03-29 | Stop reason: HOSPADM

## 2023-03-29 RX ORDER — OXYCODONE HYDROCHLORIDE AND ACETAMINOPHEN 5; 325 MG/1; MG/1
2 TABLET ORAL
Status: DISCONTINUED | OUTPATIENT
Start: 2023-03-29 | End: 2023-03-29 | Stop reason: HOSPADM

## 2023-03-29 RX ORDER — GLYCOPYRROLATE 0.2 MG/ML
0.4 INJECTION INTRAMUSCULAR; INTRAVENOUS ONCE
Status: DISCONTINUED | OUTPATIENT
Start: 2023-03-29 | End: 2023-03-29 | Stop reason: HOSPADM

## 2023-03-29 RX ORDER — IPRATROPIUM BROMIDE AND ALBUTEROL SULFATE 2.5; .5 MG/3ML; MG/3ML
1 SOLUTION RESPIRATORY (INHALATION)
Status: DISCONTINUED | OUTPATIENT
Start: 2023-03-29 | End: 2023-03-29 | Stop reason: HOSPADM

## 2023-03-29 RX ORDER — OXYCODONE HYDROCHLORIDE AND ACETAMINOPHEN 5; 325 MG/1; MG/1
1 TABLET ORAL
Status: DISCONTINUED | OUTPATIENT
Start: 2023-03-29 | End: 2023-03-29 | Stop reason: HOSPADM

## 2023-03-29 RX ORDER — MEPERIDINE HYDROCHLORIDE 50 MG/ML
12.5 INJECTION INTRAMUSCULAR; INTRAVENOUS; SUBCUTANEOUS EVERY 5 MIN PRN
Status: DISCONTINUED | OUTPATIENT
Start: 2023-03-29 | End: 2023-03-29 | Stop reason: HOSPADM

## 2023-03-29 RX ORDER — LABETALOL HYDROCHLORIDE 5 MG/ML
10 INJECTION, SOLUTION INTRAVENOUS
Status: DISCONTINUED | OUTPATIENT
Start: 2023-03-29 | End: 2023-03-29 | Stop reason: HOSPADM

## 2023-03-29 RX ORDER — LIDOCAINE HYDROCHLORIDE 10 MG/ML
INJECTION, SOLUTION INFILTRATION; PERINEURAL PRN
Status: DISCONTINUED | OUTPATIENT
Start: 2023-03-29 | End: 2023-03-29 | Stop reason: SDUPTHER

## 2023-03-29 RX ORDER — LIDOCAINE HYDROCHLORIDE 10 MG/ML
1 INJECTION, SOLUTION INFILTRATION; PERINEURAL
Status: DISCONTINUED | OUTPATIENT
Start: 2023-03-29 | End: 2023-03-29 | Stop reason: HOSPADM

## 2023-03-29 RX ORDER — SODIUM CHLORIDE, SODIUM LACTATE, POTASSIUM CHLORIDE, CALCIUM CHLORIDE 600; 310; 30; 20 MG/100ML; MG/100ML; MG/100ML; MG/100ML
INJECTION, SOLUTION INTRAVENOUS CONTINUOUS
Status: DISCONTINUED | OUTPATIENT
Start: 2023-03-29 | End: 2023-03-29 | Stop reason: HOSPADM

## 2023-03-29 RX ORDER — MORPHINE SULFATE 2 MG/ML
2 INJECTION, SOLUTION INTRAMUSCULAR; INTRAVENOUS EVERY 5 MIN PRN
Status: DISCONTINUED | OUTPATIENT
Start: 2023-03-29 | End: 2023-03-29 | Stop reason: HOSPADM

## 2023-03-29 RX ORDER — PROPOFOL 10 MG/ML
INJECTION, EMULSION INTRAVENOUS PRN
Status: DISCONTINUED | OUTPATIENT
Start: 2023-03-29 | End: 2023-03-29 | Stop reason: SDUPTHER

## 2023-03-29 RX ORDER — DIPHENHYDRAMINE HYDROCHLORIDE 50 MG/ML
12.5 INJECTION INTRAMUSCULAR; INTRAVENOUS
Status: DISCONTINUED | OUTPATIENT
Start: 2023-03-29 | End: 2023-03-29 | Stop reason: HOSPADM

## 2023-03-29 RX ORDER — PROPOFOL 10 MG/ML
INJECTION, EMULSION INTRAVENOUS CONTINUOUS PRN
Status: DISCONTINUED | OUTPATIENT
Start: 2023-03-29 | End: 2023-03-29 | Stop reason: SDUPTHER

## 2023-03-29 RX ORDER — HYDRALAZINE HYDROCHLORIDE 20 MG/ML
10 INJECTION INTRAMUSCULAR; INTRAVENOUS
Status: DISCONTINUED | OUTPATIENT
Start: 2023-03-29 | End: 2023-03-29 | Stop reason: HOSPADM

## 2023-03-29 RX ADMIN — LIDOCAINE HYDROCHLORIDE 40 MG: 10 INJECTION, SOLUTION INFILTRATION; PERINEURAL at 08:32

## 2023-03-29 RX ADMIN — SODIUM CHLORIDE: 9 INJECTION, SOLUTION INTRAVENOUS at 08:30

## 2023-03-29 RX ADMIN — PROPOFOL 70 MG: 10 INJECTION, EMULSION INTRAVENOUS at 08:32

## 2023-03-29 RX ADMIN — PROPOFOL 150 MCG/KG/MIN: 10 INJECTION, EMULSION INTRAVENOUS at 08:32

## 2023-03-29 RX ADMIN — SODIUM CHLORIDE: 9 INJECTION, SOLUTION INTRAVENOUS at 09:02

## 2023-03-29 RX ADMIN — PROPOFOL 30 MG: 10 INJECTION, EMULSION INTRAVENOUS at 08:44

## 2023-03-29 ASSESSMENT — PAIN - FUNCTIONAL ASSESSMENT: PAIN_FUNCTIONAL_ASSESSMENT: NONE - DENIES PAIN

## 2023-03-29 NOTE — H&P
HISTORY:  Depression No  Anxiety No  Suicide No       Family History   Problem Relation Age of Onset    Heart Disease Mother         CAD s/p stents    Heart Disease Father         CAD,  at 72 of MI    High Blood Pressure Father       No family history of colon cancer, Crohn's disease, or ulcerative colitis    Problems with Sedation/Anesthesia in the past? no    REVIEW OF SYSTEMS:  12 point review of systems negative other than mentioned above. PHYSICAL EXAM:    Vitals:  /87   Pulse 90   Temp 98.6 °F (37 °C)   Resp 17   Ht 5' 11\" (1.803 m)   Wt 252 lb (114.3 kg)   SpO2 97%   BMI 35.15 kg/m²     Focused Exam related to procedure:    General appearance: NAD, conversant   Eyes: anicteric sclerae, moist conjunctivae; no lid-lag; PERRLA   Lungs: CTA, with normal respiratory effort and no intercostal retractions   CV: RRR, no MRGs   Abdomen: Soft, non-tender; no masses or HSM   Skin: Normal temperature, turgor and texture; no rash, ulcers or subcutaneous nodules     DATA:  CBC:   Lab Results   Component Value Date    WBC 6.6 2023    HGB 16.0 2023    HCT 49.2 2023    MCV 90.8 2023     2023     BUN/Cr:   Lab Results   Component Value Date    BUN 20 2023   ,   Lab Results   Component Value Date    CREATININE 0.81 2023     Potassium:   Lab Results   Component Value Date    K 4.8 2023     PT/INR: No results found for: INR, PROTIME    ASSESSMENT AND PLAN:       1. Patient is a 57 y.o. male with above specified procedure planned. Expected Sedation/Anesthesia Type: MAC    2. ASA ([address] Anesthesiology) Anesthesia Status: Class 3 - A patient with severe systemic disease that limits activity but is not incapacitating    3. Mallampati: II (soft palate, uvula, fauces visible)  4. Procedure options, risks and benefits reviewed with Patient. Patient expresses understanding.     5.  Consent has been signed:  Yes    Fawad David MD

## 2023-03-29 NOTE — ANESTHESIA POSTPROCEDURE EVALUATION
Department of Anesthesiology  Postprocedure Note    Patient: Melinda Ruiz  MRN: [id number]  YOB: 1960  Date of evaluation: 3/29/2023      Procedure Summary     Date: 03/29/23 Room / Location: [place] [address] / [facility]    Anesthesia Start: 0830 Anesthesia Stop: 5476    Procedure: COLONOSCOPY POLYPECTOMY REMOVAL  HOT SNARE OF DISTAL SIGMOID POLYP AND RECTAL POLYPS Diagnosis:       Screen for colon cancer      (Screen for colon cancer [Z12.11])    Surgeons: Otilia Bamu MD Responsible Provider: Frances Dewitt MD    Anesthesia Type: MAC ASA Status: 2          Anesthesia Type: No value filed.     Tyler Phase I: Tyler Score: 8    Tyler Phase II: Tyler Score: 8      Anesthesia Post Evaluation    Patient location during evaluation: PACU  Patient participation: complete - patient participated  Level of consciousness: awake and alert  Airway patency: patent  Nausea & Vomiting: no nausea and no vomiting  Complications: no  Cardiovascular status: hemodynamically stable  Respiratory status: room air and spontaneous ventilation  Hydration status: euvolemic  Multimodal analgesia pain management approach

## 2023-03-29 NOTE — ANESTHESIA PRE PROCEDURE
BP Readings from Last 3 Encounters:   03/29/23 136/87   03/03/23 129/86   02/21/23 132/76       NPO Status: Time of last liquid consumption: 2100                        Time of last solid consumption: 2000                        Date of last liquid consumption: 03/28/23                        Date of last solid food consumption: 03/27/23    BMI:   Wt Readings from Last 3 Encounters:   03/29/23 252 lb (114.3 kg)   03/03/23 265 lb (120.2 kg)   02/21/23 265 lb (120.2 kg)     Body mass index is 35.15 kg/m². CBC:   Lab Results   Component Value Date/Time    WBC 6.6 02/21/2023 11:35 AM    RBC 5.42 02/21/2023 11:35 AM    HGB 16.0 02/21/2023 11:35 AM    HCT 49.2 02/21/2023 11:35 AM    MCV 90.8 02/21/2023 11:35 AM    RDW 13.5 02/21/2023 11:35 AM     02/21/2023 11:35 AM       CMP:   Lab Results   Component Value Date/Time     02/21/2023 11:35 AM    K 4.8 02/21/2023 11:35 AM     02/21/2023 11:35 AM    CO2 26 02/21/2023 11:35 AM    BUN 20 02/21/2023 11:35 AM    CREATININE 0.81 02/21/2023 11:35 AM    GFRAA >60 12/15/2021 10:00 AM    LABGLOM >60 02/21/2023 11:35 AM    GLUCOSE 139 02/21/2023 11:35 AM    PROT 7.4 02/21/2023 11:35 AM    CALCIUM 10.0 02/21/2023 11:35 AM    BILITOT 0.7 02/21/2023 11:35 AM    ALKPHOS 87 02/21/2023 11:35 AM    AST 29 02/21/2023 11:35 AM    ALT 36 02/21/2023 11:35 AM       POC Tests: No results for input(s): POCGLU, POCNA, POCK, POCCL, POCBUN, POCHEMO, POCHCT in the last 72 hours.     Coags: No results found for: PROTIME, INR, APTT    HCG (If Applicable): No results found for: PREGTESTUR, PREGSERUM, HCG, HCGQUANT     ABGs: No results found for: PHART, PO2ART, VIF9RTA, CBO6QFV, BEART, M7FZTDWU     Type & Screen (If Applicable):  No results found for: LABABO, LABRH    Drug/Infectious Status (If Applicable):  Lab Results   Component Value Date/Time    HEPCAB NONREACTIVE 04/10/2014 09:40 AM       COVID-19 Screening (If Applicable): No results found for:

## 2023-03-29 NOTE — OP NOTE
Operative Note      Patient: Yandel Layne  YOB: 1960  MRN: [id number]    Date of Procedure: 3/29/2023    Pre-Op Diagnosis: Screen for colon cancer [Z12.11]    Post-Op Diagnosis: FAIR PREP, colon polyps, hemorrhoids       Procedure(s):  COLONOSCOPY POLYPECTOMY REMOVAL  HOT SNARE OF DISTAL SIGMOID POLYP AND RECTAL POLYPS    Surgeon(s):  Corby Porter MD    Assistant:   * No surgical staff found *    Anesthesia: Monitor Anesthesia Care    Estimated Blood Loss (mL): Minimal    Complications: None    Specimens:   ID Type Source Tests Collected by Time Destination   A : distal sigmoid polyp  Tissue Tissue SURGICAL PATHOLOGY Corby Porter MD 3/29/2023 3021    B : RECTAL POLYPS Tissue Tissue SURGICAL PATHOLOGY Corby Porter MD 3/29/2023 9507        Implants:  * No implants in log *      Drains: * No LDAs found *              [facility]     COLONOSCOPY    PROCEDURE DATE: 03/29/23    REFERRING PHYSICIAN: No ref. provider found     PRIMARY CARE PROVIDER: Karrie Kawasaki, APRN - DOE    ATTENDING PHYSICIAN: Corby Porter MD     HISTORY: Mr. Yandel Layne is a 57 y.o. male who presents to the  endoscopy unit for colonoscopy. The patient's clinical history is remarkable for HTN, DM, GERD, referred for screening colonoscopy. He is currently medically stable and appropriate for the planned procedure. PREOPERATIVE DIAGNOSIS: screening for colon cancer. PROCEDURES:   Transanal Colonoscopy with polypectomy (snare cautery). POSTPROCEDURE DIAGNOSIS:    FAIR PREP     1) Subpedunculated 12 mm polyp in the sigmoid colon s/p hot snare polyectomy and removal  2) 2 separate hyperplastic appearing polyps, 8mm and 9 mm s/p hot snare polypectomy and removal  3) Moderate external hemorrhoids    MEDICATIONS:     MAC per anesthesia     EBL <10cc      INSTRUMENT: Olympus CF-H190 AL Pediatric flexible Colonoscope.      PREPARATION: The nature and character of the procedure as well as risks, benefits, and a document that actually reflects the content of the visit, the document can still have some errors including those of syntax and sound a like substitutions which may escape proof reading. It such instances, actual meaning can be extrapolated by contextual diversion. The patient was counseled at length about the risks of ehsan Covid-19 during their perioperative period and any recovery window from their procedure. The patient was made aware that ehsan Covid-19  may worsen their prognosis for recovering from their procedure  and lend to a higher morbidity and/or mortality risk. All material risks, benefits, and reasonable alternatives including postponing the procedure were discussed. The patient DOES wish to proceed with the procedure at this time.      Electronically signed by Mariela Perera MD on 3/29/2023 at 9:08 AM

## 2023-03-30 LAB — SURGICAL PATHOLOGY REPORT: NORMAL

## 2023-04-14 DIAGNOSIS — I10 ESSENTIAL HYPERTENSION: ICD-10-CM

## 2023-04-14 DIAGNOSIS — E78.00 PURE HYPERCHOLESTEROLEMIA: ICD-10-CM

## 2023-04-18 RX ORDER — SIMVASTATIN 20 MG
20 TABLET ORAL NIGHTLY
Qty: 90 TABLET | Refills: 1 | Status: SHIPPED | OUTPATIENT
Start: 2023-04-18

## 2023-04-18 RX ORDER — ASPIRIN 81 MG/1
81 TABLET ORAL DAILY
Qty: 90 TABLET | Refills: 1 | Status: SHIPPED | OUTPATIENT
Start: 2023-04-18

## 2023-04-18 RX ORDER — LISINOPRIL AND HYDROCHLOROTHIAZIDE 25; 20 MG/1; MG/1
1 TABLET ORAL DAILY
Qty: 90 TABLET | Refills: 1 | Status: SHIPPED | OUTPATIENT
Start: 2023-04-18

## 2023-04-28 PROBLEM — Z12.11 SCREEN FOR COLON CANCER: Status: RESOLVED | Noted: 2023-03-29 | Resolved: 2023-04-28

## 2023-07-11 NOTE — TELEPHONE ENCOUNTER
LOV  2-21-23  RTO 2-1-2024   LRF 2-21-23          Controlled Substance Monitoring:    Acute and Chronic Pain Monitoring:   No flowsheet data found.

## 2023-07-13 RX ORDER — SEMAGLUTIDE 0.68 MG/ML
INJECTION, SOLUTION SUBCUTANEOUS
Qty: 3 ML | Refills: 2 | Status: SHIPPED | OUTPATIENT
Start: 2023-07-13

## 2023-08-02 ENCOUNTER — CARE COORDINATION (OUTPATIENT)
Dept: OTHER | Facility: CLINIC | Age: 63
End: 2023-08-02

## 2023-08-02 NOTE — CARE COORDINATION
Ambulatory Care Coordination Note    ACM attempted to reach patient for introduction to Associate Care Management related to BWWD. HIPAA compliant message left requesting a return phone call at patient convenience.      Plan for follow-up call in 7-10 days      Future Appointments   Date Time Provider [address] [place] [address]   8/22/2023  8:15 AM JAMES Mckeon - DOE [place]

## 2023-08-09 ENCOUNTER — CARE COORDINATION (OUTPATIENT)
Dept: OTHER | Facility: CLINIC | Age: 63
End: 2023-08-09

## 2023-08-09 NOTE — CARE COORDINATION
Ambulatory Care Coordination Note    ACM attempted 2nd outreach to patient for introduction to Associate Care Management related to BWWD. No Voicemail option. Unable to Reach Letter sent to patient via [place]. Will continue to outreach. No future appointments.

## 2023-08-22 ENCOUNTER — CARE COORDINATION (OUTPATIENT)
Dept: OTHER | Facility: CLINIC | Age: 63
End: 2023-08-22

## 2023-08-22 NOTE — CARE COORDINATION
on the patient?: No identified areas of concern   How would you rate their home environment in terms of safety and stability (including domestic violence, insecure housing, neighbor harassment)?: Consistently safe, supportive, stable, no identified problems   How do daily activities impact on the patient's well-being? (include current or anticipated unemployment, work, caregiving, access to transportation or other): No identified problems or perceived positive benefits   How would you rate their social network (family, work, friends)?: Good participation with social networks   How would you rate their financial resources (including ability to afford all required medical care)?: Financially secure, resources adequate, no identified problems   How wells does the patient now understand their health and well-being (symptoms, signs or risk factors) and what they need to do to manage their health?: Reasonable to good understanding but do not feel able to engage with advice at this time   How well do you think your patient can engage in healthcare discussions? (Barriers include language, deafness, aphasia, alcohol or drug problems, learning difficulties, concentration): Adequate communication, with or without minor barriers   Do other services need to be involved to help this patient?: Other care/services not required at this time   Are current services involved with this patient well-coordinated? (Include coordination with other services you are now recommendation): All required care/services in place and well-coordinated   Suggested Interventions and Community Resources  Diabetes Education: In Process          Other Interventions: Set up/Review Goals, Set up/Review an Education Plan              No future appointments.

## 2023-08-28 ENCOUNTER — CARE COORDINATION (OUTPATIENT)
Dept: OTHER | Facility: CLINIC | Age: 63
End: 2023-08-28

## 2023-08-28 NOTE — CARE COORDINATION
Ambulatory Care Coordination Note    No outreach complete today. Encounter open for conversion with Shari Peng.

## 2023-08-30 ENCOUNTER — CARE COORDINATION (OUTPATIENT)
Dept: OTHER | Facility: CLINIC | Age: 63
End: 2023-08-30

## 2023-08-30 NOTE — CARE COORDINATION
Ambulatory Care Coordination Note    ACM attempted to reach patient for care management follow up call regarding BWWD. HIPAA compliant message left requesting a return phone call at patient convenience. Plan for follow-up call in 5-7 days    No future appointments.

## 2023-09-06 ENCOUNTER — CARE COORDINATION (OUTPATIENT)
Dept: OTHER | Facility: CLINIC | Age: 63
End: 2023-09-06

## 2023-09-06 NOTE — CARE COORDINATION
Ambulatory Care Coordination Note    ACM attempted to reach patient for care management follow up call regarding BWWD. HIPAA compliant message left requesting a return phone call at patient convenience. Plan for follow-up call in 14-21 days     No future appointments.

## 2023-09-15 ENCOUNTER — CARE COORDINATION (OUTPATIENT)
Dept: OTHER | Facility: CLINIC | Age: 63
End: 2023-09-15

## 2023-09-15 NOTE — CARE COORDINATION
Ambulatory Care Coordination Note    Received return call from pt. Discussed BWWD and pt is not eligable for BWWD as he is not transferred to [facility]. Pt understands. Discussed DM management and changes in benefits. Will sign off. No further outreach scheduled with this ACM, ACM will sign off care team at this time. Episode of Care resolved. Patient  has this ACM's contact information if future needs arise. No further follow-up call indicated      No future appointments.

## 2023-10-24 DIAGNOSIS — I10 ESSENTIAL HYPERTENSION: ICD-10-CM

## 2023-10-24 DIAGNOSIS — E78.00 PURE HYPERCHOLESTEROLEMIA: ICD-10-CM

## 2023-10-24 DIAGNOSIS — E11.9 CONTROLLED TYPE 2 DIABETES MELLITUS WITHOUT COMPLICATION, WITHOUT LONG-TERM CURRENT USE OF INSULIN (HCC): ICD-10-CM

## 2023-10-24 NOTE — TELEPHONE ENCOUNTER
LOV 02/21/2023   RTO 11/16/2023  LRF 04/18/2023          Controlled Substance Monitoring:    Acute and Chronic Pain Monitoring:        No data to display

## 2023-10-25 RX ORDER — SEMAGLUTIDE 0.68 MG/ML
INJECTION, SOLUTION SUBCUTANEOUS
Qty: 1 ML | Refills: 0 | Status: SHIPPED | OUTPATIENT
Start: 2023-10-25

## 2023-10-25 RX ORDER — LISINOPRIL AND HYDROCHLOROTHIAZIDE 25; 20 MG/1; MG/1
1 TABLET ORAL DAILY
Qty: 30 TABLET | Refills: 0 | Status: SHIPPED | OUTPATIENT
Start: 2023-10-25

## 2023-10-25 RX ORDER — SIMVASTATIN 20 MG
20 TABLET ORAL NIGHTLY
Qty: 30 TABLET | Refills: 0 | Status: SHIPPED | OUTPATIENT
Start: 2023-10-25

## 2023-10-25 RX ORDER — EMPAGLIFLOZIN, METFORMIN HYDROCHLORIDE 12.5; 1 MG/1; MG/1
1 TABLET, EXTENDED RELEASE ORAL 2 TIMES DAILY WITH MEALS
Qty: 60 TABLET | Refills: 0 | Status: SHIPPED | OUTPATIENT
Start: 2023-10-25

## 2023-11-06 ENCOUNTER — CLINICAL DOCUMENTATION (OUTPATIENT)
Dept: PHARMACY | Facility: CLINIC | Age: 63
End: 2023-11-06

## 2023-11-06 NOTE — PROGRESS NOTES
Pharmacy Pop Care Documentation:     Josefina Guevara is being removed from the diabetes management program for the following reason(s):  Per [facility] HR patient no longer has benefits    [address] [facility] Only    Program: [facility] in place:  No  Gap Closed?: Yes   Time Spent (min): 5

## 2023-11-16 ENCOUNTER — OFFICE VISIT (OUTPATIENT)
Dept: FAMILY MEDICINE CLINIC | Age: 63
End: 2023-11-16
Payer: COMMERCIAL

## 2023-11-16 VITALS
WEIGHT: 251 LBS | BODY MASS INDEX: 35.14 KG/M2 | HEART RATE: 81 BPM | HEIGHT: 71 IN | SYSTOLIC BLOOD PRESSURE: 122 MMHG | TEMPERATURE: 96.9 F | OXYGEN SATURATION: 97 % | DIASTOLIC BLOOD PRESSURE: 84 MMHG

## 2023-11-16 DIAGNOSIS — E11.9 CONTROLLED TYPE 2 DIABETES MELLITUS WITHOUT COMPLICATION, WITHOUT LONG-TERM CURRENT USE OF INSULIN (HCC): ICD-10-CM

## 2023-11-16 DIAGNOSIS — E78.00 PURE HYPERCHOLESTEROLEMIA: ICD-10-CM

## 2023-11-16 DIAGNOSIS — R25.2 MUSCLE CRAMPS: Primary | ICD-10-CM

## 2023-11-16 DIAGNOSIS — E66.01 SEVERE OBESITY (BMI 35.0-39.9) WITH COMORBIDITY (HCC): ICD-10-CM

## 2023-11-16 DIAGNOSIS — I10 ESSENTIAL HYPERTENSION: ICD-10-CM

## 2023-11-16 LAB — HBA1C MFR BLD: 7.7 %

## 2023-11-16 PROCEDURE — 3051F HG A1C>EQUAL 7.0%<8.0%: CPT

## 2023-11-16 PROCEDURE — 83036 HEMOGLOBIN GLYCOSYLATED A1C: CPT

## 2023-11-16 PROCEDURE — 99213 OFFICE O/P EST LOW 20 MIN: CPT

## 2023-11-16 PROCEDURE — 3074F SYST BP LT 130 MM HG: CPT

## 2023-11-16 PROCEDURE — 3079F DIAST BP 80-89 MM HG: CPT

## 2023-11-16 PROCEDURE — 1123F ACP DISCUSS/DSCN MKR DOCD: CPT

## 2023-11-16 RX ORDER — SIMVASTATIN 20 MG
20 TABLET ORAL NIGHTLY
Qty: 30 TABLET | Refills: 0 | Status: SHIPPED | OUTPATIENT
Start: 2023-11-16

## 2023-11-16 RX ORDER — CALCIUM CARBONATE 300MG(750)
400 TABLET,CHEWABLE ORAL DAILY
Qty: 30 TABLET | Refills: 0 | Status: SHIPPED | OUTPATIENT
Start: 2023-11-16

## 2023-11-16 RX ORDER — LISINOPRIL AND HYDROCHLOROTHIAZIDE 25; 20 MG/1; MG/1
1 TABLET ORAL DAILY
Qty: 30 TABLET | Refills: 0 | Status: SHIPPED | OUTPATIENT
Start: 2023-11-16

## 2023-11-16 RX ORDER — SEMAGLUTIDE 1.34 MG/ML
1 INJECTION, SOLUTION SUBCUTANEOUS WEEKLY
Qty: 3 ML | Refills: 1 | Status: SHIPPED | OUTPATIENT
Start: 2023-11-16

## 2023-11-16 RX ORDER — EMPAGLIFLOZIN, METFORMIN HYDROCHLORIDE 12.5; 1 MG/1; MG/1
1 TABLET, EXTENDED RELEASE ORAL 2 TIMES DAILY WITH MEALS
Qty: 60 TABLET | Refills: 0 | Status: SHIPPED | OUTPATIENT
Start: 2023-11-16

## 2023-11-16 RX ORDER — SEMAGLUTIDE 0.68 MG/ML
INJECTION, SOLUTION SUBCUTANEOUS
Qty: 1 ML | Refills: 0 | Status: CANCELLED | OUTPATIENT
Start: 2023-11-16

## 2023-11-16 ASSESSMENT — PATIENT HEALTH QUESTIONNAIRE - PHQ9
SUM OF ALL RESPONSES TO PHQ QUESTIONS 1-9: 0
SUM OF ALL RESPONSES TO PHQ QUESTIONS 1-9: 0
2. FEELING DOWN, DEPRESSED OR HOPELESS: 0
SUM OF ALL RESPONSES TO PHQ9 QUESTIONS 1 & 2: 0
1. LITTLE INTEREST OR PLEASURE IN DOING THINGS: 0
SUM OF ALL RESPONSES TO PHQ QUESTIONS 1-9: 0
SUM OF ALL RESPONSES TO PHQ QUESTIONS 1-9: 0

## 2023-11-16 ASSESSMENT — ENCOUNTER SYMPTOMS
CONSTIPATION: 0
DIARRHEA: 0
WHEEZING: 0
SHORTNESS OF BREATH: 0

## 2023-11-16 NOTE — PROGRESS NOTES
Increase ozempic to 1mg weekly. Continue to work on improving diet as discussed  Refills given  Reviewed labs from Feb.   HTN-WNL. No changes to medications  Magnesium at night for muscle cramps. Electrolytes to water during the day. Work on incorporating exercise through out the day. Return in 6 months. Return in about 6 months (around 5/16/2024) for DM recheck.  .         Electronically signed by Johnny Powell CNP on 11/16/2023 at 7:35 AM

## 2023-12-27 DIAGNOSIS — E11.9 CONTROLLED TYPE 2 DIABETES MELLITUS WITHOUT COMPLICATION, WITHOUT LONG-TERM CURRENT USE OF INSULIN (HCC): ICD-10-CM

## 2023-12-27 DIAGNOSIS — I10 ESSENTIAL HYPERTENSION: ICD-10-CM

## 2023-12-27 DIAGNOSIS — E78.00 PURE HYPERCHOLESTEROLEMIA: ICD-10-CM

## 2023-12-27 NOTE — TELEPHONE ENCOUNTER
LOV 11/16/23   RTO 5/16/24  LRF     Patient was in the office- 11/ 16 -  New scripts were sent to [facility]. Insurance will not cover medication.  He would like 1 month supply sent to [facility] in [place] till he gets [facility] information fixed     Lisinopril 20-25 mg   12/20/23  Metformin 12.5 - 1000 mg    12/20/23  Semaglutide 1MG/Dose  12/20/23  Simvastatin  20 MG     12/20/23      Controlled Substance Monitoring:    Acute and Chronic Pain Monitoring:        No data to display

## 2023-12-29 RX ORDER — EMPAGLIFLOZIN, METFORMIN HYDROCHLORIDE 12.5; 1 MG/1; MG/1
1 TABLET, EXTENDED RELEASE ORAL 2 TIMES DAILY WITH MEALS
Qty: 60 TABLET | Refills: 0 | Status: SHIPPED | OUTPATIENT
Start: 2023-12-29 | End: 2024-01-28

## 2023-12-29 RX ORDER — LISINOPRIL AND HYDROCHLOROTHIAZIDE 25; 20 MG/1; MG/1
1 TABLET ORAL DAILY
Qty: 30 TABLET | Refills: 0 | Status: SHIPPED | OUTPATIENT
Start: 2023-12-29 | End: 2024-01-28

## 2023-12-29 RX ORDER — SIMVASTATIN 20 MG
20 TABLET ORAL NIGHTLY
Qty: 30 TABLET | Refills: 0 | Status: SHIPPED | OUTPATIENT
Start: 2023-12-29 | End: 2024-01-28

## 2023-12-29 RX ORDER — SEMAGLUTIDE 1.34 MG/ML
1 INJECTION, SOLUTION SUBCUTANEOUS WEEKLY
Qty: 3 ML | Refills: 3 | Status: SHIPPED | OUTPATIENT
Start: 2023-12-29

## 2024-01-23 DIAGNOSIS — I10 ESSENTIAL HYPERTENSION: ICD-10-CM

## 2024-01-23 DIAGNOSIS — E78.00 PURE HYPERCHOLESTEROLEMIA: ICD-10-CM

## 2024-01-23 DIAGNOSIS — E11.9 CONTROLLED TYPE 2 DIABETES MELLITUS WITHOUT COMPLICATION, WITHOUT LONG-TERM CURRENT USE OF INSULIN (HCC): ICD-10-CM

## 2024-01-23 NOTE — TELEPHONE ENCOUNTER
LOV 11/16/2023   RTO 05/19/2024  LRF 12/29/2023    Mail away changed d/t insurance change    Ozempic will need a PA [phone]            Controlled Substance Monitoring:    Acute and Chronic Pain Monitoring:        No data to display

## 2024-01-25 RX ORDER — SEMAGLUTIDE 1.34 MG/ML
1 INJECTION, SOLUTION SUBCUTANEOUS WEEKLY
Qty: 3 ML | Refills: 1 | Status: SHIPPED | OUTPATIENT
Start: 2024-01-25

## 2024-01-25 RX ORDER — EMPAGLIFLOZIN, METFORMIN HYDROCHLORIDE 12.5; 1 MG/1; MG/1
1 TABLET, EXTENDED RELEASE ORAL 2 TIMES DAILY WITH MEALS
Qty: 180 TABLET | Refills: 1 | Status: SHIPPED | OUTPATIENT
Start: 2024-01-25 | End: 2024-02-24

## 2024-01-25 RX ORDER — SIMVASTATIN 20 MG
20 TABLET ORAL NIGHTLY
Qty: 90 TABLET | Refills: 1 | Status: SHIPPED | OUTPATIENT
Start: 2024-01-25 | End: 2024-07-23

## 2024-01-25 RX ORDER — LISINOPRIL AND HYDROCHLOROTHIAZIDE 25; 20 MG/1; MG/1
1 TABLET ORAL DAILY
Qty: 90 TABLET | Refills: 1 | Status: SHIPPED | OUTPATIENT
Start: 2024-01-25 | End: 2024-07-23

## 2024-02-27 DIAGNOSIS — E11.9 CONTROLLED TYPE 2 DIABETES MELLITUS WITHOUT COMPLICATION, WITHOUT LONG-TERM CURRENT USE OF INSULIN (HCC): ICD-10-CM

## 2024-02-27 NOTE — TELEPHONE ENCOUNTER
LOV 11/16/23    RTO 05/16/24  LRF 01/25/24    Stopped in the office      Controlled Substance Monitoring:    Acute and Chronic Pain Monitoring:        No data to display

## 2024-02-28 RX ORDER — SEMAGLUTIDE 1.34 MG/ML
1 INJECTION, SOLUTION SUBCUTANEOUS WEEKLY
Qty: 3 ML | Refills: 1 | Status: SHIPPED | OUTPATIENT
Start: 2024-02-28

## 2024-05-15 NOTE — PROGRESS NOTES
[facility] PHYSICIANS  [facility] [address] [address]  Dept: [phone]      CHIEF COMPLAINT:      Chief Complaint   Patient presents with    Diabetes       SUBJECTIVE      Valerio Navarro is a 63 y.o. male who presents DM/HTN follow up    DM-Ozempic 2mg and Synjardy XR. A1C was 7.7 in November. Increased ozempic to 1mg weekly. Will check his blood sugars on rare occasion. States diet has been up and down, been trying to eat more vegetables. Has been eating oatmeal in the morning. Denies any signs of low blood sugars. No numbness or open wounds in feet.     HTN-prinizide 20-25mg. Denies chest pain, SOB, headaches, or signs of high or low BP    Eye doctor-[facility] in October      Review of Systems   Constitutional:  Negative for fatigue and unexpected weight change.   Respiratory:  Negative for shortness of breath and wheezing.    Cardiovascular:  Negative for chest pain and palpitations.   Gastrointestinal:  Negative for constipation and diarrhea.   Musculoskeletal:  Negative for arthralgias and joint swelling.   Skin:  Negative for rash and wound.   Neurological:  Negative for dizziness and headaches.   Psychiatric/Behavioral: Negative.          HISTORY:        Past Medical History:   Diagnosis Date    Allergic rhinitis     BPH (benign prostatic hyperplasia)     Diabetes mellitus (HCC)     Hyperlipidemia     Hypertension     Type II or unspecified type diabetes mellitus without mention of complication, not stated as uncontrolled         Past Surgical History:   Procedure Laterality Date    COLONOSCOPY  jan 2013    polypectomy bx adenomatous polyp    COLONOSCOPY N/A 3/29/2023    COLONOSCOPY POLYPECTOMY REMOVAL HOT SNARE OF DISTAL SIGMOID POLYP AND RECTAL POLYPS performed by Solange Morales MD at [facility] OR    PROSTATE BIOPSY  Dec 2012, aug 2013    normal       Family History   Problem Relation Age of Onset    Heart Disease Mother         CAD

## 2024-05-16 ENCOUNTER — HOSPITAL ENCOUNTER (OUTPATIENT)
Age: 64
Setting detail: SPECIMEN
Discharge: HOME | End: 2024-05-16

## 2024-05-16 ENCOUNTER — OFFICE VISIT (OUTPATIENT)
Dept: FAMILY MEDICINE CLINIC | Age: 64
End: 2024-05-16
Payer: COMMERCIAL

## 2024-05-16 VITALS
WEIGHT: 248 LBS | DIASTOLIC BLOOD PRESSURE: 80 MMHG | TEMPERATURE: 96.8 F | SYSTOLIC BLOOD PRESSURE: 122 MMHG | HEIGHT: 71 IN | BODY MASS INDEX: 34.72 KG/M2 | OXYGEN SATURATION: 94 % | HEART RATE: 74 BPM

## 2024-05-16 DIAGNOSIS — I10 ESSENTIAL HYPERTENSION: ICD-10-CM

## 2024-05-16 DIAGNOSIS — E11.311 DIABETIC MACULAR EDEMA (HCC): ICD-10-CM

## 2024-05-16 DIAGNOSIS — Z13.29 THYROID DISORDER SCREENING: ICD-10-CM

## 2024-05-16 DIAGNOSIS — E11.9 CONTROLLED TYPE 2 DIABETES MELLITUS WITHOUT COMPLICATION, WITHOUT LONG-TERM CURRENT USE OF INSULIN (HCC): ICD-10-CM

## 2024-05-16 DIAGNOSIS — R97.20 ELEVATED PSA: ICD-10-CM

## 2024-05-16 DIAGNOSIS — E78.00 PURE HYPERCHOLESTEROLEMIA: ICD-10-CM

## 2024-05-16 DIAGNOSIS — R97.20 ELEVATED PSA: Primary | ICD-10-CM

## 2024-05-16 DIAGNOSIS — R74.8 ELEVATED LIVER ENZYMES: ICD-10-CM

## 2024-05-16 DIAGNOSIS — E11.9 CONTROLLED TYPE 2 DIABETES MELLITUS WITHOUT COMPLICATION, WITHOUT LONG-TERM CURRENT USE OF INSULIN (HCC): Primary | ICD-10-CM

## 2024-05-16 LAB
ALBUMIN SERPL-MCNC: 4.6 G/DL (ref 3.5–5.2)
ALBUMIN/GLOB SERPL: 2 {RATIO} (ref 1–2.5)
ALP SERPL-CCNC: 73 U/L (ref 40–129)
ALT SERPL-CCNC: 28 U/L (ref 10–50)
ANION GAP SERPL CALCULATED.3IONS-SCNC: 12 MMOL/L (ref 9–16)
AST SERPL-CCNC: 29 U/L (ref 10–50)
BILIRUB SERPL-MCNC: 0.6 MG/DL (ref 0–1.2)
BUN SERPL-MCNC: 17 MG/DL (ref 8–23)
CALCIUM SERPL-MCNC: 9.6 MG/DL (ref 8.6–10.4)
CHLORIDE SERPL-SCNC: 103 MMOL/L (ref 98–107)
CHOLEST SERPL-MCNC: 124 MG/DL (ref 0–199)
CHOLESTEROL/HDL RATIO: 3
CO2 SERPL-SCNC: 25 MMOL/L (ref 20–31)
CREAT SERPL-MCNC: 1 MG/DL (ref 0.7–1.2)
CREAT UR-MCNC: 33.3 MG/DL (ref 39–259)
ERYTHROCYTE [DISTWIDTH] IN BLOOD BY AUTOMATED COUNT: 13.7 % (ref 11.8–14.4)
GFR, ESTIMATED: 88 ML/MIN/1.73M2
GLUCOSE SERPL-MCNC: 155 MG/DL (ref 74–99)
HBA1C MFR BLD: 7.2 %
HCT VFR BLD AUTO: 48.8 % (ref 40.7–50.3)
HDLC SERPL-MCNC: 47 MG/DL
HGB BLD-MCNC: 15.7 G/DL (ref 13–17)
LDLC SERPL CALC-MCNC: 63 MG/DL (ref 0–100)
MCH RBC QN AUTO: 30.1 PG (ref 25.2–33.5)
MCHC RBC AUTO-ENTMCNC: 32.2 G/DL (ref 28.4–34.8)
MCV RBC AUTO: 93.5 FL (ref 82.6–102.9)
MICROALBUMIN UR-MCNC: <12 MG/L (ref 0–20)
MICROALBUMIN/CREAT UR-RTO: ABNORMAL MCG/MG CREAT (ref 0–17)
NRBC BLD-RTO: 0 PER 100 WBC
PLATELET # BLD AUTO: 144 K/UL (ref 138–453)
PMV BLD AUTO: 9.7 FL (ref 8.1–13.5)
POTASSIUM SERPL-SCNC: 4.2 MMOL/L (ref 3.7–5.3)
PROT SERPL-MCNC: 7.3 G/DL (ref 6.6–8.7)
PSA SERPL-MCNC: 11.7 NG/ML (ref 0–4)
RBC # BLD AUTO: 5.22 M/UL (ref 4.21–5.77)
SODIUM SERPL-SCNC: 140 MMOL/L (ref 136–145)
TRIGL SERPL-MCNC: 74 MG/DL (ref 0–149)
TSH SERPL DL<=0.05 MIU/L-ACNC: 0.64 UIU/ML (ref 0.27–4.2)
VLDLC SERPL CALC-MCNC: 15 MG/DL
WBC OTHER # BLD: 5.2 K/UL (ref 3.5–11.3)

## 2024-05-16 PROCEDURE — 99214 OFFICE O/P EST MOD 30 MIN: CPT

## 2024-05-16 PROCEDURE — 3079F DIAST BP 80-89 MM HG: CPT

## 2024-05-16 PROCEDURE — 83036 HEMOGLOBIN GLYCOSYLATED A1C: CPT

## 2024-05-16 PROCEDURE — 1123F ACP DISCUSS/DSCN MKR DOCD: CPT

## 2024-05-16 PROCEDURE — 3074F SYST BP LT 130 MM HG: CPT

## 2024-05-16 PROCEDURE — 3051F HG A1C>EQUAL 7.0%<8.0%: CPT

## 2024-05-16 RX ORDER — EMPAGLIFLOZIN, METFORMIN HYDROCHLORIDE 12.5; 1 MG/1; MG/1
TABLET, EXTENDED RELEASE ORAL 2 TIMES DAILY
COMMUNITY
Start: 2024-05-02

## 2024-05-16 SDOH — ECONOMIC STABILITY: FOOD INSECURITY: WITHIN THE PAST 12 MONTHS, YOU WORRIED THAT YOUR FOOD WOULD RUN OUT BEFORE YOU GOT MONEY TO BUY MORE.: NEVER TRUE

## 2024-05-16 SDOH — ECONOMIC STABILITY: INCOME INSECURITY: HOW HARD IS IT FOR YOU TO PAY FOR THE VERY BASICS LIKE FOOD, HOUSING, MEDICAL CARE, AND HEATING?: NOT HARD AT ALL

## 2024-05-16 SDOH — ECONOMIC STABILITY: FOOD INSECURITY: WITHIN THE PAST 12 MONTHS, THE FOOD YOU BOUGHT JUST DIDN'T LAST AND YOU DIDN'T HAVE MONEY TO GET MORE.: NEVER TRUE

## 2024-05-16 ASSESSMENT — ENCOUNTER SYMPTOMS
SHORTNESS OF BREATH: 0
CONSTIPATION: 0
WHEEZING: 0
DIARRHEA: 0

## 2024-05-16 ASSESSMENT — PATIENT HEALTH QUESTIONNAIRE - PHQ9
2. FEELING DOWN, DEPRESSED OR HOPELESS: SEVERAL DAYS
SUM OF ALL RESPONSES TO PHQ QUESTIONS 1-9: 1
1. LITTLE INTEREST OR PLEASURE IN DOING THINGS: NOT AT ALL
SUM OF ALL RESPONSES TO PHQ9 QUESTIONS 1 & 2: 1

## 2024-05-16 NOTE — RESULT ENCOUNTER NOTE
All labs are good except your PSA. It continues to rise. It appears you used to see urology? Have you seen them in the last year? If not we will need to get you in with a new one. I placed a referral just in case.

## 2024-08-05 DIAGNOSIS — E11.9 CONTROLLED TYPE 2 DIABETES MELLITUS WITHOUT COMPLICATION, WITHOUT LONG-TERM CURRENT USE OF INSULIN (HCC): Primary | ICD-10-CM

## 2024-08-05 DIAGNOSIS — I10 ESSENTIAL HYPERTENSION: ICD-10-CM

## 2024-08-05 DIAGNOSIS — E78.00 PURE HYPERCHOLESTEROLEMIA: ICD-10-CM

## 2024-08-05 NOTE — TELEPHONE ENCOUNTER
LOV 5/16/24  LRF Lisinopril, simvastatin 1/25/24, Synjardy 5/2/24  RTO 6 months    Health Maintenance   Topic Date Due    Diabetic retinal exam  10/18/2018    Diabetic foot exam  06/22/2022    AAA screen  Never done    Flu vaccine (1) 08/01/2024    Shingles vaccine (1 of 2) 11/16/2024 (Originally 7/24/2008)    Pneumococcal 65+ years Vaccine (2 of 2 - PCV) 11/16/2024 (Originally 4/8/2017)    COVID-19 Vaccine (3 - 2023-24 season) 11/16/2024 (Originally 9/1/2023)    Respiratory Syncytial Virus (RSV) Pregnant or age 60 yrs+ (1 - 1-dose 60+ series) 05/16/2025 (Originally 7/24/2018)    A1C test (Diabetic or Prediabetic)  05/16/2025    Diabetic Alb to Cr ratio (uACR) test  05/16/2025    Lipids  05/16/2025    Depression Screen  05/16/2025    GFR test (Diabetes, CKD 3-4, OR last GFR 15-59)  05/16/2025    Prostate Specific Antigen (PSA) Screening or Monitoring  05/16/2025    DTaP/Tdap/Td vaccine (3 - Td or Tdap) 03/03/2033    Colorectal Cancer Screen  03/29/2033    Hepatitis C screen  Completed    Hepatitis A vaccine  Aged Out    Hepatitis B vaccine  Aged Out    Hib vaccine  Aged Out    Polio vaccine  Aged Out    Meningococcal (ACWY) vaccine  Aged Out    Pneumococcal 0-64 years Vaccine  Discontinued    HIV screen  Discontinued             (applicable per patient's age: Cancer Screenings, Depression Screening, Fall Risk Screening, Immunizations)    Hemoglobin A1C (%)   Date Value   05/16/2024 7.2   11/16/2023 7.7   02/21/2023 9.1 (H)     AST (U/L)   Date Value   05/16/2024 29     ALT (U/L)   Date Value   05/16/2024 28     BUN (mg/dL)   Date Value   05/16/2024 17      (goal A1C is < 7)   (goal LDL is <100) need 30-50% reduction from baseline     BP Readings from Last 3 Encounters:   05/16/24 122/80   11/16/23 122/84   03/29/23 110/72    (goal /80)      All Future Testing planned in [facility]:  Lab Frequency Next Occurrence       Next Visit Date:  Future Appointments   Date Time Provider Department Center   11/18/2024  7:30

## 2024-08-09 RX ORDER — LISINOPRIL AND HYDROCHLOROTHIAZIDE 25; 20 MG/1; MG/1
1 TABLET ORAL DAILY
Qty: 90 TABLET | Refills: 0 | Status: SHIPPED | OUTPATIENT
Start: 2024-08-09 | End: 2025-02-05

## 2024-08-09 RX ORDER — SIMVASTATIN 20 MG
20 TABLET ORAL NIGHTLY
Qty: 90 TABLET | Refills: 0 | Status: SHIPPED | OUTPATIENT
Start: 2024-08-09 | End: 2025-02-05

## 2024-08-09 RX ORDER — EMPAGLIFLOZIN, METFORMIN HYDROCHLORIDE 12.5; 1 MG/1; MG/1
1 TABLET, EXTENDED RELEASE ORAL 2 TIMES DAILY
Qty: 180 TABLET | Refills: 0 | Status: SHIPPED | OUTPATIENT
Start: 2024-08-09

## 2024-09-03 DIAGNOSIS — E11.9 CONTROLLED TYPE 2 DIABETES MELLITUS WITHOUT COMPLICATION, WITHOUT LONG-TERM CURRENT USE OF INSULIN (HCC): ICD-10-CM

## 2024-09-03 RX ORDER — SEMAGLUTIDE 1.34 MG/ML
1 INJECTION, SOLUTION SUBCUTANEOUS WEEKLY
Qty: 3 ML | Refills: 0 | Status: SHIPPED | OUTPATIENT
Start: 2024-09-03

## 2024-09-03 NOTE — TELEPHONE ENCOUNTER
Valerio Navarro is calling to request a refill on the following medication(s):    Last Visit Date (If Applicable):  5/16/2024    Next Visit Date:    11/18/2024    Medication Request:  Requested Prescriptions     Pending Prescriptions Disp Refills    Semaglutide, 1 MG/DOSE, (OZEMPIC, 1 MG/DOSE,) 2 MG/1.5ML SOPN 3 mL 1     Sig: Inject 1 mg into the skin once a week

## 2024-09-23 DIAGNOSIS — E11.9 CONTROLLED TYPE 2 DIABETES MELLITUS WITHOUT COMPLICATION, WITHOUT LONG-TERM CURRENT USE OF INSULIN (HCC): ICD-10-CM

## 2024-09-26 RX ORDER — SEMAGLUTIDE 1.34 MG/ML
1 INJECTION, SOLUTION SUBCUTANEOUS WEEKLY
Qty: 3 ML | Refills: 3 | Status: SHIPPED | OUTPATIENT
Start: 2024-09-26

## 2024-11-01 DIAGNOSIS — E11.9 CONTROLLED TYPE 2 DIABETES MELLITUS WITHOUT COMPLICATION, WITHOUT LONG-TERM CURRENT USE OF INSULIN (HCC): ICD-10-CM

## 2024-11-01 DIAGNOSIS — I10 ESSENTIAL HYPERTENSION: ICD-10-CM

## 2024-11-01 DIAGNOSIS — E78.00 PURE HYPERCHOLESTEROLEMIA: ICD-10-CM

## 2024-11-01 NOTE — TELEPHONE ENCOUNTER
LOV 5/16/24   RTO NA, several messages sent in past  LRF 8/9/24          Controlled Substance Monitoring:    Acute and Chronic Pain Monitoring:        No data to display

## 2024-11-04 RX ORDER — EMPAGLIFLOZIN, METFORMIN HYDROCHLORIDE 12.5; 1 MG/1; MG/1
TABLET, EXTENDED RELEASE ORAL
Qty: 180 TABLET | Refills: 0 | OUTPATIENT
Start: 2024-11-04

## 2024-11-04 RX ORDER — LISINOPRIL AND HYDROCHLOROTHIAZIDE 20; 25 MG/1; MG/1
1 TABLET ORAL DAILY
Qty: 90 TABLET | Refills: 0 | OUTPATIENT
Start: 2024-11-04

## 2024-11-04 RX ORDER — SIMVASTATIN 20 MG
20 TABLET ORAL NIGHTLY
Qty: 90 TABLET | Refills: 0 | OUTPATIENT
Start: 2024-11-04

## 2024-11-13 DIAGNOSIS — E11.9 CONTROLLED TYPE 2 DIABETES MELLITUS WITHOUT COMPLICATION, WITHOUT LONG-TERM CURRENT USE OF INSULIN (HCC): ICD-10-CM

## 2024-11-13 DIAGNOSIS — I10 ESSENTIAL HYPERTENSION: ICD-10-CM

## 2024-11-13 DIAGNOSIS — E78.00 PURE HYPERCHOLESTEROLEMIA: ICD-10-CM

## 2024-11-14 RX ORDER — LISINOPRIL AND HYDROCHLOROTHIAZIDE 20; 25 MG/1; MG/1
1 TABLET ORAL DAILY
Qty: 90 TABLET | Refills: 0 | OUTPATIENT
Start: 2024-11-14

## 2024-11-14 RX ORDER — SIMVASTATIN 20 MG
20 TABLET ORAL NIGHTLY
Qty: 90 TABLET | Refills: 0 | OUTPATIENT
Start: 2024-11-14

## 2024-11-14 RX ORDER — EMPAGLIFLOZIN, METFORMIN HYDROCHLORIDE 12.5; 1 MG/1; MG/1
TABLET, EXTENDED RELEASE ORAL
Qty: 180 TABLET | Refills: 0 | OUTPATIENT
Start: 2024-11-14

## 2024-11-25 DIAGNOSIS — I10 ESSENTIAL HYPERTENSION: ICD-10-CM

## 2024-11-25 DIAGNOSIS — E78.00 PURE HYPERCHOLESTEROLEMIA: ICD-10-CM

## 2024-11-25 DIAGNOSIS — E11.9 CONTROLLED TYPE 2 DIABETES MELLITUS WITHOUT COMPLICATION, WITHOUT LONG-TERM CURRENT USE OF INSULIN (HCC): ICD-10-CM

## 2024-11-25 NOTE — TELEPHONE ENCOUNTER
LOV  05/16/2024  RTO 12/19/2024  LRF     Synjardy XR- 12.5   08/09/2024  Simvastatin 20 mg   08/09/2024  Lisinopril 20-25 mg   08/09/2024      Patient was with [facility]- Appointment scheduled with     Controlled Substance Monitoring:    Acute and Chronic Pain Monitoring:        No data to display

## 2024-11-26 RX ORDER — EMPAGLIFLOZIN, METFORMIN HYDROCHLORIDE 12.5; 1 MG/1; MG/1
1 TABLET, EXTENDED RELEASE ORAL 2 TIMES DAILY
Qty: 180 TABLET | Refills: 0 | Status: SHIPPED | OUTPATIENT
Start: 2024-11-26

## 2024-11-26 RX ORDER — LISINOPRIL AND HYDROCHLOROTHIAZIDE 20; 25 MG/1; MG/1
1 TABLET ORAL DAILY
Qty: 90 TABLET | Refills: 0 | Status: SHIPPED | OUTPATIENT
Start: 2024-11-26 | End: 2025-05-25

## 2024-11-26 RX ORDER — SIMVASTATIN 20 MG
20 TABLET ORAL NIGHTLY
Qty: 90 TABLET | Refills: 0 | Status: SHIPPED | OUTPATIENT
Start: 2024-11-26 | End: 2025-05-25

## 2024-12-18 ASSESSMENT — ENCOUNTER SYMPTOMS
COLOR CHANGE: 0
ABDOMINAL DISTENTION: 0
SINUS PAIN: 0
COUGH: 0
RHINORRHEA: 0
DIARRHEA: 0
NAUSEA: 0
CHEST TIGHTNESS: 0
VOMITING: 0
BACK PAIN: 0
SHORTNESS OF BREATH: 0

## 2024-12-18 NOTE — PROGRESS NOTES
Valerio Navarro is a 64 y.o. male who presents in office today with Self establish new care with office. Previous PCP was Meg Wallace.   Chief Complaint   Patient presents with    Diabetes    Hypertension    Gastroesophageal Reflux    Cholesterol Problem    Benign Prostatic Hypertrophy        History of Present Illness:     HPI    Patient here to establish care.  Previous PCP was Meg Wallace.  Last office visit was May of this year.    History of diabetes, last A1c was 7.2% in May of this year repeat today is still showing an A1c of 7.2%.  He is currently taking Ozempic and Synjardy XR.  He tells me that he is tolerating the medications well and denies any nausea, vomiting or diarrhea.  Weight has been stable from last visit, and he tells me that he knows he does not exercise enough, and that he is trying to eat less, but he does eat what he wants and if he needs to he will save the food for later but does admit to overeating.  He states he is down to 1 bottle of soda a day which is diet or zero sugar, but does tell me that he eats a lot of candy specifically chocolate.  Tells me that he eats more fruit than vegetables.  He feels that most of his inactivity is due to being on his feet at work therefore when he gets home he does not feel like exercising.  States he does not check his sugar at home, but is wondering if he should.  States at times he became obsessed with and was unsure why it at 1 point it would be higher another point it would be low therefore he had stopped.  I encouraged him to check sugars if he is feeling ill or feels that he may be low.    Has some concerns that his knees hurt when going up and down stairs.  He is wondering if this could be arthritis as he says they do not hurt at rest but just when moving up and down the stairs.  He says as of now he is just taking a slow but is still able to climb them.  At this point he is denying any further workup.  He also tells me this is

## 2024-12-19 ENCOUNTER — OFFICE VISIT (OUTPATIENT)
Dept: FAMILY MEDICINE CLINIC | Age: 64
End: 2024-12-19

## 2024-12-19 VITALS
DIASTOLIC BLOOD PRESSURE: 84 MMHG | SYSTOLIC BLOOD PRESSURE: 138 MMHG | WEIGHT: 248.8 LBS | HEART RATE: 76 BPM | BODY MASS INDEX: 34.7 KG/M2 | TEMPERATURE: 97.3 F | OXYGEN SATURATION: 96 % | RESPIRATION RATE: 16 BRPM

## 2024-12-19 DIAGNOSIS — Z13.6 SCREENING FOR AAA (ABDOMINAL AORTIC ANEURYSM): ICD-10-CM

## 2024-12-19 DIAGNOSIS — Z76.89 ENCOUNTER TO ESTABLISH CARE: Primary | ICD-10-CM

## 2024-12-19 DIAGNOSIS — E78.00 PURE HYPERCHOLESTEROLEMIA: ICD-10-CM

## 2024-12-19 DIAGNOSIS — K21.9 GASTROESOPHAGEAL REFLUX DISEASE, UNSPECIFIED WHETHER ESOPHAGITIS PRESENT: ICD-10-CM

## 2024-12-19 DIAGNOSIS — N40.1 BENIGN PROSTATIC HYPERPLASIA WITH NOCTURIA: ICD-10-CM

## 2024-12-19 DIAGNOSIS — Z87.891 HX OF SMOKING: ICD-10-CM

## 2024-12-19 DIAGNOSIS — R42 DIZZINESS: ICD-10-CM

## 2024-12-19 DIAGNOSIS — R35.1 BENIGN PROSTATIC HYPERPLASIA WITH NOCTURIA: ICD-10-CM

## 2024-12-19 DIAGNOSIS — I10 ESSENTIAL HYPERTENSION: ICD-10-CM

## 2024-12-19 DIAGNOSIS — M25.561 PAIN IN BOTH KNEES, UNSPECIFIED CHRONICITY: ICD-10-CM

## 2024-12-19 DIAGNOSIS — E11.9 CONTROLLED TYPE 2 DIABETES MELLITUS WITHOUT COMPLICATION, WITHOUT LONG-TERM CURRENT USE OF INSULIN (HCC): ICD-10-CM

## 2024-12-19 DIAGNOSIS — R97.20 ELEVATED PSA: ICD-10-CM

## 2024-12-19 DIAGNOSIS — M25.562 PAIN IN BOTH KNEES, UNSPECIFIED CHRONICITY: ICD-10-CM

## 2024-12-19 DIAGNOSIS — S91.309A: ICD-10-CM

## 2024-12-19 LAB — HBA1C MFR BLD: 7.2 %

## 2024-12-19 RX ORDER — SEMAGLUTIDE 2.68 MG/ML
2 INJECTION, SOLUTION SUBCUTANEOUS
Qty: 3 ML | Refills: 2 | Status: SHIPPED | OUTPATIENT
Start: 2024-12-19

## 2024-12-19 RX ORDER — EMPAGLIFLOZIN, METFORMIN HYDROCHLORIDE 12.5; 1 MG/1; MG/1
1 TABLET, EXTENDED RELEASE ORAL 2 TIMES DAILY
Qty: 180 TABLET | Refills: 1 | Status: SHIPPED | OUTPATIENT
Start: 2024-12-19 | End: 2025-12-19

## 2024-12-19 RX ORDER — LISINOPRIL AND HYDROCHLOROTHIAZIDE 20; 25 MG/1; MG/1
1 TABLET ORAL DAILY
Qty: 90 TABLET | Refills: 1 | Status: SHIPPED | OUTPATIENT
Start: 2024-12-19 | End: 2025-06-17

## 2024-12-19 RX ORDER — SEMAGLUTIDE 1.34 MG/ML
1 INJECTION, SOLUTION SUBCUTANEOUS WEEKLY
Qty: 9 ML | Refills: 3 | Status: CANCELLED | OUTPATIENT
Start: 2024-12-19 | End: 2025-12-19

## 2024-12-19 RX ORDER — SIMVASTATIN 20 MG
20 TABLET ORAL NIGHTLY
Qty: 90 TABLET | Refills: 1 | Status: SHIPPED | OUTPATIENT
Start: 2024-12-19 | End: 2025-06-17

## 2024-12-19 ASSESSMENT — PATIENT HEALTH QUESTIONNAIRE - PHQ9
5. POOR APPETITE OR OVEREATING: SEVERAL DAYS
10. IF YOU CHECKED OFF ANY PROBLEMS, HOW DIFFICULT HAVE THESE PROBLEMS MADE IT FOR YOU TO DO YOUR WORK, TAKE CARE OF THINGS AT HOME, OR GET ALONG WITH OTHER PEOPLE: SOMEWHAT DIFFICULT
4. FEELING TIRED OR HAVING LITTLE ENERGY: NOT AT ALL
8. MOVING OR SPEAKING SO SLOWLY THAT OTHER PEOPLE COULD HAVE NOTICED. OR THE OPPOSITE, BEING SO FIGETY OR RESTLESS THAT YOU HAVE BEEN MOVING AROUND A LOT MORE THAN USUAL: SEVERAL DAYS
7. TROUBLE CONCENTRATING ON THINGS, SUCH AS READING THE NEWSPAPER OR WATCHING TELEVISION: NOT AT ALL
SUM OF ALL RESPONSES TO PHQ QUESTIONS 1-9: 4
3. TROUBLE FALLING OR STAYING ASLEEP: NOT AT ALL
SUM OF ALL RESPONSES TO PHQ QUESTIONS 1-9: 4
SUM OF ALL RESPONSES TO PHQ QUESTIONS 1-9: 4
9. THOUGHTS THAT YOU WOULD BE BETTER OFF DEAD, OR OF HURTING YOURSELF: NOT AT ALL
2. FEELING DOWN, DEPRESSED OR HOPELESS: NOT AT ALL
6. FEELING BAD ABOUT YOURSELF - OR THAT YOU ARE A FAILURE OR HAVE LET YOURSELF OR YOUR FAMILY DOWN: NOT AT ALL
SUM OF ALL RESPONSES TO PHQ QUESTIONS 1-9: 4
1. LITTLE INTEREST OR PLEASURE IN DOING THINGS: MORE THAN HALF THE DAYS
SUM OF ALL RESPONSES TO PHQ9 QUESTIONS 1 & 2: 2

## 2025-03-16 DIAGNOSIS — E11.9 CONTROLLED TYPE 2 DIABETES MELLITUS WITHOUT COMPLICATION, WITHOUT LONG-TERM CURRENT USE OF INSULIN: ICD-10-CM

## 2025-03-17 RX ORDER — SEMAGLUTIDE 2.68 MG/ML
INJECTION, SOLUTION SUBCUTANEOUS
Qty: 3 ML | Refills: 2 | Status: SHIPPED | OUTPATIENT
Start: 2025-03-17

## 2025-03-17 NOTE — TELEPHONE ENCOUNTER
LOV 12/19/24   RTO 3/20/2025  LRF 12/19/24          Controlled Substance Monitoring:    Acute and Chronic Pain Monitoring:        No data to display

## 2025-06-11 DIAGNOSIS — E11.9 CONTROLLED TYPE 2 DIABETES MELLITUS WITHOUT COMPLICATION, WITHOUT LONG-TERM CURRENT USE OF INSULIN (HCC): ICD-10-CM

## 2025-06-12 RX ORDER — SEMAGLUTIDE 2.68 MG/ML
INJECTION, SOLUTION SUBCUTANEOUS
Qty: 3 ML | Refills: 0 | Status: SHIPPED | OUTPATIENT
Start: 2025-06-12 | End: 2025-07-10

## 2025-06-23 NOTE — PROGRESS NOTES
appetite change (decreased due to medications). Negative for fatigue, fever and unexpected weight change.   HENT:  Negative for congestion, rhinorrhea, sinus pain and sneezing.    Eyes:         Wears glasses   Respiratory:  Negative for cough, chest tightness and shortness of breath.    Cardiovascular:  Negative for chest pain.        Hypertension   Gastrointestinal:  Negative for abdominal distention, diarrhea, nausea and vomiting.   Endocrine:        DM2   Genitourinary:  Negative for dysuria, flank pain, frequency and urgency.   Musculoskeletal:  Positive for arthralgias (left leg). Negative for back pain, neck pain and neck stiffness.   Skin:  Negative for color change.   Allergic/Immunologic: Positive for food allergies. Negative for environmental allergies.   Neurological:  Negative for weakness, light-headedness and headaches.   Psychiatric/Behavioral:  Negative for agitation, decreased concentration, dysphoric mood, hallucinations, self-injury and sleep disturbance.      See HPI     Physical Assessment (Objective)     /76 (BP Site: Left Upper Arm, Patient Position: Sitting, BP Cuff Size: Medium Adult)   Pulse 77   Temp 97.8 °F (36.6 °C) (Temporal)   Ht 1.803 m (5' 11\")   Wt 111.9 kg (246 lb 12.8 oz)   SpO2 98%   BMI 34.42 kg/m²      Physical Exam  Vitals and nursing note reviewed.   Constitutional:       Appearance: He is obese.   HENT:      Head: Normocephalic.   Musculoskeletal:         General: Normal range of motion.      Cervical back: Normal range of motion.   Neurological:      Mental Status: He is alert.   Psychiatric:         Mood and Affect: Mood is anxious.         Behavior: Behavior normal.         Thought Content: Thought content normal.         Judgment: Judgment normal.      Comments: Distressed?        Diagnoses / Plan:   1. Essential hypertension  -     Albumin/Creatinine Ratio, Urine; Future  -     Lipid Panel; Future  -     Comprehensive Metabolic Panel, Fasting; Future  -

## 2025-07-03 ENCOUNTER — OFFICE VISIT (OUTPATIENT)
Dept: FAMILY MEDICINE CLINIC | Age: 65
End: 2025-07-03
Payer: COMMERCIAL

## 2025-07-03 ENCOUNTER — HOSPITAL ENCOUNTER (OUTPATIENT)
Age: 65
Setting detail: SPECIMEN
Discharge: HOME | End: 2025-07-03

## 2025-07-03 VITALS
HEART RATE: 77 BPM | TEMPERATURE: 97.8 F | SYSTOLIC BLOOD PRESSURE: 124 MMHG | OXYGEN SATURATION: 98 % | BODY MASS INDEX: 34.55 KG/M2 | HEIGHT: 71 IN | WEIGHT: 246.8 LBS | DIASTOLIC BLOOD PRESSURE: 76 MMHG

## 2025-07-03 DIAGNOSIS — M79.605 LEFT LEG PAIN: ICD-10-CM

## 2025-07-03 DIAGNOSIS — Z12.5 SCREENING FOR PROSTATE CANCER: ICD-10-CM

## 2025-07-03 DIAGNOSIS — R53.83 FATIGUE, UNSPECIFIED TYPE: ICD-10-CM

## 2025-07-03 DIAGNOSIS — E78.00 PURE HYPERCHOLESTEROLEMIA: ICD-10-CM

## 2025-07-03 DIAGNOSIS — E11.9 CONTROLLED TYPE 2 DIABETES MELLITUS WITHOUT COMPLICATION, WITHOUT LONG-TERM CURRENT USE OF INSULIN (HCC): ICD-10-CM

## 2025-07-03 DIAGNOSIS — I10 ESSENTIAL HYPERTENSION: ICD-10-CM

## 2025-07-03 DIAGNOSIS — I10 ESSENTIAL HYPERTENSION: Primary | ICD-10-CM

## 2025-07-03 DIAGNOSIS — K21.9 GASTROESOPHAGEAL REFLUX DISEASE, UNSPECIFIED WHETHER ESOPHAGITIS PRESENT: ICD-10-CM

## 2025-07-03 LAB
25(OH)D3 SERPL-MCNC: 24.2 NG/ML (ref 30–100)
ALBUMIN SERPL-MCNC: 4.5 G/DL (ref 3.5–5.2)
ALBUMIN/GLOB SERPL: 1.6 {RATIO} (ref 1–2.5)
ALP SERPL-CCNC: 97 U/L (ref 40–129)
ALT SERPL-CCNC: 46 U/L (ref 10–50)
ANION GAP SERPL CALCULATED.3IONS-SCNC: 15 MMOL/L (ref 9–16)
AST SERPL-CCNC: 30 U/L (ref 10–50)
BILIRUB SERPL-MCNC: 0.4 MG/DL (ref 0–1.2)
BUN SERPL-MCNC: 13 MG/DL (ref 8–23)
CALCIUM SERPL-MCNC: 10.2 MG/DL (ref 8.6–10.4)
CHLORIDE SERPL-SCNC: 102 MMOL/L (ref 98–107)
CHOLEST SERPL-MCNC: 147 MG/DL (ref 0–199)
CHOLESTEROL/HDL RATIO: 3.3
CO2 SERPL-SCNC: 25 MMOL/L (ref 20–31)
CREAT SERPL-MCNC: 1 MG/DL (ref 0.7–1.2)
CREAT UR-MCNC: 64.5 MG/DL (ref 39–259)
ERYTHROCYTE [DISTWIDTH] IN BLOOD BY AUTOMATED COUNT: 13.4 % (ref 11.8–14.4)
EST. AVERAGE GLUCOSE BLD GHB EST-MCNC: 177 MG/DL
FOLATE SERPL-MCNC: 26.2 NG/ML (ref 4.8–24.2)
GFR, ESTIMATED: 83 ML/MIN/1.73M2
GLUCOSE P FAST SERPL-MCNC: 185 MG/DL (ref 74–99)
HBA1C MFR BLD: 7.8 % (ref 4–6)
HCT VFR BLD AUTO: 48.5 % (ref 40.7–50.3)
HDLC SERPL-MCNC: 44 MG/DL
HGB BLD-MCNC: 15.9 G/DL (ref 13–17)
LDLC SERPL CALC-MCNC: 71 MG/DL (ref 0–100)
MAGNESIUM SERPL-MCNC: 2.1 MG/DL (ref 1.6–2.4)
MCH RBC QN AUTO: 29.7 PG (ref 25.2–33.5)
MCHC RBC AUTO-ENTMCNC: 32.8 G/DL (ref 28.4–34.8)
MCV RBC AUTO: 90.7 FL (ref 82.6–102.9)
MICROALBUMIN UR-MCNC: <12 MG/L (ref 0–20)
MICROALBUMIN/CREAT UR-RTO: NORMAL MCG/MG CREAT (ref 0–17)
NRBC BLD-RTO: 0 PER 100 WBC
PLATELET # BLD AUTO: 148 K/UL (ref 138–453)
PMV BLD AUTO: 10 FL (ref 8.1–13.5)
POTASSIUM SERPL-SCNC: 4.3 MMOL/L (ref 3.7–5.3)
PROT SERPL-MCNC: 7.4 G/DL (ref 6.6–8.7)
PSA SERPL-MCNC: 11.8 NG/ML (ref 0–4)
RBC # BLD AUTO: 5.35 M/UL (ref 4.21–5.77)
SHBG SERPL-SCNC: 28 NMOL/L (ref 19–76)
SODIUM SERPL-SCNC: 142 MMOL/L (ref 136–145)
TESTOST FREE MFR SERPL: 55.4 PG/ML (ref 47–244)
TESTOST SERPL-MCNC: 260 NG/DL (ref 193–740)
TRIGL SERPL-MCNC: 160 MG/DL
TSH SERPL DL<=0.05 MIU/L-ACNC: 0.96 UIU/ML (ref 0.27–4.2)
VIT B12 SERPL-MCNC: 881 PG/ML (ref 232–1245)
VLDLC SERPL CALC-MCNC: 32 MG/DL (ref 1–30)
WBC OTHER # BLD: 5.7 K/UL (ref 3.5–11.3)

## 2025-07-03 PROCEDURE — 3078F DIAST BP <80 MM HG: CPT

## 2025-07-03 PROCEDURE — 99214 OFFICE O/P EST MOD 30 MIN: CPT

## 2025-07-03 PROCEDURE — G2211 COMPLEX E/M VISIT ADD ON: HCPCS

## 2025-07-03 PROCEDURE — 1123F ACP DISCUSS/DSCN MKR DOCD: CPT

## 2025-07-03 PROCEDURE — 3074F SYST BP LT 130 MM HG: CPT

## 2025-07-03 RX ORDER — TIZANIDINE 2 MG/1
2 TABLET ORAL NIGHTLY PRN
Qty: 15 TABLET | Refills: 0 | Status: SHIPPED | OUTPATIENT
Start: 2025-07-03

## 2025-07-03 SDOH — ECONOMIC STABILITY: FOOD INSECURITY: WITHIN THE PAST 12 MONTHS, THE FOOD YOU BOUGHT JUST DIDN'T LAST AND YOU DIDN'T HAVE MONEY TO GET MORE.: NEVER TRUE

## 2025-07-03 SDOH — ECONOMIC STABILITY: FOOD INSECURITY: WITHIN THE PAST 12 MONTHS, YOU WORRIED THAT YOUR FOOD WOULD RUN OUT BEFORE YOU GOT MONEY TO BUY MORE.: NEVER TRUE

## 2025-07-03 ASSESSMENT — ENCOUNTER SYMPTOMS: DIARRHEA: 0

## 2025-07-03 ASSESSMENT — PATIENT HEALTH QUESTIONNAIRE - PHQ9
SUM OF ALL RESPONSES TO PHQ QUESTIONS 1-9: 2
2. FEELING DOWN, DEPRESSED OR HOPELESS: NOT AT ALL
SUM OF ALL RESPONSES TO PHQ QUESTIONS 1-9: 2
1. LITTLE INTEREST OR PLEASURE IN DOING THINGS: MORE THAN HALF THE DAYS

## 2025-07-07 ENCOUNTER — RESULTS FOLLOW-UP (OUTPATIENT)
Dept: FAMILY MEDICINE CLINIC | Age: 65
End: 2025-07-07

## 2025-07-07 DIAGNOSIS — E11.9 CONTROLLED TYPE 2 DIABETES MELLITUS WITHOUT COMPLICATION, WITHOUT LONG-TERM CURRENT USE OF INSULIN (HCC): Primary | ICD-10-CM

## 2025-07-07 DIAGNOSIS — N40.1 BENIGN PROSTATIC HYPERPLASIA WITH NOCTURIA: ICD-10-CM

## 2025-07-07 DIAGNOSIS — R97.20 ELEVATED PSA: ICD-10-CM

## 2025-07-07 DIAGNOSIS — M79.605 LEFT LEG PAIN: ICD-10-CM

## 2025-07-07 DIAGNOSIS — R35.1 BENIGN PROSTATIC HYPERPLASIA WITH NOCTURIA: ICD-10-CM

## 2025-07-09 DIAGNOSIS — E11.9 CONTROLLED TYPE 2 DIABETES MELLITUS WITHOUT COMPLICATION, WITHOUT LONG-TERM CURRENT USE OF INSULIN (HCC): ICD-10-CM

## 2025-07-10 RX ORDER — SEMAGLUTIDE 2.68 MG/ML
INJECTION, SOLUTION SUBCUTANEOUS
Qty: 3 ML | Refills: 1 | Status: SHIPPED | OUTPATIENT
Start: 2025-07-10

## 2025-07-10 NOTE — TELEPHONE ENCOUNTER
LOV 07/03/2025   RTO 01/05/2026  LRF 06/12/2025          Controlled Substance Monitoring:    Acute and Chronic Pain Monitoring:        No data to display

## 2025-07-23 ENCOUNTER — TELEPHONE (OUTPATIENT)
Dept: FAMILY MEDICINE CLINIC | Age: 65
End: 2025-07-23

## 2025-07-23 DIAGNOSIS — Z87.891 HISTORY OF SMOKING: ICD-10-CM

## 2025-07-23 DIAGNOSIS — Z13.6 SCREENING FOR AAA (AORTIC ABDOMINAL ANEURYSM): Primary | ICD-10-CM

## 2025-07-23 NOTE — TELEPHONE ENCOUNTER
[facility] scheduling department called and said the order for the [facility] needs to be switched to [facility]. Order is pended

## 2025-08-05 ENCOUNTER — HOSPITAL ENCOUNTER (OUTPATIENT)
Dept: CT IMAGING | Age: 65
Discharge: HOME | End: 2025-08-07
Payer: COMMERCIAL

## 2025-08-05 DIAGNOSIS — M79.605 LEFT LEG PAIN: ICD-10-CM

## 2025-08-05 PROCEDURE — 73700 CT LOWER EXTREMITY W/O DYE: CPT

## 2025-08-08 DIAGNOSIS — M79.605 LEFT LEG PAIN: ICD-10-CM

## 2025-08-08 DIAGNOSIS — E78.00 PURE HYPERCHOLESTEROLEMIA: ICD-10-CM

## 2025-08-08 DIAGNOSIS — I10 ESSENTIAL HYPERTENSION: ICD-10-CM

## 2025-08-08 DIAGNOSIS — E11.9 CONTROLLED TYPE 2 DIABETES MELLITUS WITHOUT COMPLICATION, WITHOUT LONG-TERM CURRENT USE OF INSULIN (HCC): ICD-10-CM

## 2025-08-11 RX ORDER — LISINOPRIL AND HYDROCHLOROTHIAZIDE 20; 25 MG/1; MG/1
1 TABLET ORAL DAILY
Qty: 30 TABLET | Refills: 2 | Status: SHIPPED | OUTPATIENT
Start: 2025-08-11 | End: 2026-02-07

## 2025-08-11 RX ORDER — EMPAGLIFLOZIN, METFORMIN HYDROCHLORIDE 12.5; 1 MG/1; MG/1
TABLET, EXTENDED RELEASE ORAL
Qty: 60 TABLET | Refills: 2 | Status: SHIPPED | OUTPATIENT
Start: 2025-08-11

## 2025-08-11 RX ORDER — TIZANIDINE 2 MG/1
TABLET ORAL
Qty: 15 TABLET | Refills: 0 | OUTPATIENT
Start: 2025-08-11

## 2025-08-11 RX ORDER — SIMVASTATIN 20 MG
20 TABLET ORAL NIGHTLY
Qty: 30 TABLET | Refills: 2 | Status: SHIPPED | OUTPATIENT
Start: 2025-08-11

## 2025-08-14 ENCOUNTER — HOSPITAL ENCOUNTER (OUTPATIENT)
Dept: VASCULAR LAB | Age: 65
Discharge: HOME | End: 2025-08-16
Payer: COMMERCIAL

## 2025-08-14 DIAGNOSIS — Z87.891 HISTORY OF SMOKING: ICD-10-CM

## 2025-08-14 DIAGNOSIS — Z13.6 SCREENING FOR AAA (AORTIC ABDOMINAL ANEURYSM): ICD-10-CM

## 2025-08-14 PROCEDURE — 76706 US ABDL AORTA SCREEN AAA: CPT | Performed by: SURGERY

## 2025-08-14 PROCEDURE — 76706 US ABDL AORTA SCREEN AAA: CPT

## 2025-08-15 LAB
VAS AORTA DIST AP: 1.72 CM
VAS AORTA DIST PSV: 52.7 CM/S
VAS AORTA DIST TR: 1.53 CM
VAS AORTA MID AP: 1.53 CM
VAS AORTA MID PSV: 66.2 CM/S
VAS AORTA MID TRANS: 1.47 CM
VAS AORTA PROX AP: 2.46 CM
VAS AORTA PROX PSV: 53.9 CM/S
VAS AORTA PROX TR: 2.37 CM
VAS LEFT COM ILIAC AP: 0.99 CM
VAS LEFT COM ILIAC TRANS: 0.91 CM
VAS RIGHT COM ILIAC AP: 1.17 CM
VAS RIGHT COM ILIAC TRANS: 0.96 CM

## (undated) DEVICE — PERRYSBURG ENDO PACK: Brand: MEDLINE INDUSTRIES, INC.

## (undated) DEVICE — ERBE NESSY®PLATE 170 SPLIT; 168CM²; CABLE 3M: Brand: ERBE

## (undated) DEVICE — 4-PORT MANIFOLD: Brand: NEPTUNE 2

## (undated) DEVICE — SNARE ENDOSCP L240CM LOOP W13MM SHTH DIA2.4MM SM OVL FLX

## (undated) DEVICE — Device: Brand: DEFENDO VALVE AND CONNECTOR KIT

## (undated) DEVICE — SOLUTION IV IRRIG 1000ML POUR BTL 2F7114